# Patient Record
Sex: FEMALE | Race: WHITE | NOT HISPANIC OR LATINO | Employment: FULL TIME | URBAN - METROPOLITAN AREA
[De-identification: names, ages, dates, MRNs, and addresses within clinical notes are randomized per-mention and may not be internally consistent; named-entity substitution may affect disease eponyms.]

---

## 2023-08-28 PROBLEM — L57.9 SKIN CHANGES DUE TO CHRONIC EXPOSURE TO NONIONIZING RADIATION, UNSPECIFIED: Status: ACTIVE | Noted: 2020-11-02

## 2023-08-28 PROBLEM — D22.60 MELANOCYTIC NEVI OF UNSPECIFIED UPPER LIMB, INCLUDING SHOULDER: Status: ACTIVE | Noted: 2020-11-02

## 2023-08-28 PROBLEM — D22.5 MELANOCYTIC NEVI OF TRUNK: Status: ACTIVE | Noted: 2020-11-02

## 2023-08-28 PROBLEM — E66.9 CLASS 2 OBESITY WITH BODY MASS INDEX (BMI) OF 38.0 TO 38.9 IN ADULT: Status: ACTIVE | Noted: 2023-08-28

## 2023-08-28 PROBLEM — L71.9 ROSACEA, UNSPECIFIED: Status: ACTIVE | Noted: 2020-11-02

## 2023-08-28 PROBLEM — D18.01 HEMANGIOMA OF SKIN AND SUBCUTANEOUS TISSUE: Status: ACTIVE | Noted: 2020-11-02

## 2023-08-28 PROBLEM — D22.39 MELANOCYTIC NEVI OF OTHER PARTS OF FACE: Status: ACTIVE | Noted: 2020-11-02

## 2023-08-28 PROBLEM — E66.812 CLASS 2 OBESITY WITH BODY MASS INDEX (BMI) OF 38.0 TO 38.9 IN ADULT: Status: ACTIVE | Noted: 2023-08-28

## 2023-08-28 PROBLEM — L98.9 SKIN LESION: Status: ACTIVE | Noted: 2023-08-28

## 2023-08-28 PROBLEM — D22.4 MELANOCYTIC NEVI OF SCALP AND NECK: Status: ACTIVE | Noted: 2020-11-02

## 2023-08-28 PROBLEM — R06.83 SNORING: Status: ACTIVE | Noted: 2023-08-28

## 2023-08-28 PROBLEM — I10 ESSENTIAL HYPERTENSION, BENIGN: Status: ACTIVE | Noted: 2023-08-28

## 2023-08-28 PROBLEM — D48.5 NEOPLASM OF UNCERTAIN BEHAVIOR OF SKIN: Status: ACTIVE | Noted: 2020-11-02

## 2023-08-28 PROBLEM — E66.01 MORBID OBESITY (MULTI): Status: ACTIVE | Noted: 2023-08-28

## 2023-08-28 PROBLEM — L63.9 ALOPECIA AREATA, UNSPECIFIED: Status: ACTIVE | Noted: 2020-11-02

## 2023-08-28 RX ORDER — CLOBETASOL PROPIONATE 0.46 MG/ML
1 SOLUTION TOPICAL
COMMUNITY
Start: 2020-05-27

## 2023-08-28 RX ORDER — BENZONATATE 100 MG/1
1 CAPSULE ORAL 3 TIMES DAILY PRN
COMMUNITY
End: 2024-03-26 | Stop reason: WASHOUT

## 2023-08-28 RX ORDER — CHLORTHALIDONE 25 MG/1
1 TABLET ORAL DAILY
COMMUNITY
Start: 2022-06-16 | End: 2023-10-27

## 2023-09-25 LAB
ALANINE AMINOTRANSFERASE (SGPT) (U/L) IN SER/PLAS: 20 U/L (ref 7–45)
ALBUMIN (G/DL) IN SER/PLAS: 4.3 G/DL (ref 3.4–5)
ALKALINE PHOSPHATASE (U/L) IN SER/PLAS: 86 U/L (ref 33–110)
ANION GAP IN SER/PLAS: 16 MMOL/L (ref 10–20)
ASPARTATE AMINOTRANSFERASE (SGOT) (U/L) IN SER/PLAS: 15 U/L (ref 9–39)
BILIRUBIN TOTAL (MG/DL) IN SER/PLAS: 0.6 MG/DL (ref 0–1.2)
CALCIUM (MG/DL) IN SER/PLAS: 9.4 MG/DL (ref 8.6–10.6)
CARBON DIOXIDE, TOTAL (MMOL/L) IN SER/PLAS: 27 MMOL/L (ref 21–32)
CHLORIDE (MMOL/L) IN SER/PLAS: 103 MMOL/L (ref 98–107)
CHOLESTEROL (MG/DL) IN SER/PLAS: 230 MG/DL (ref 0–199)
CHOLESTEROL IN HDL (MG/DL) IN SER/PLAS: 57.7 MG/DL
CHOLESTEROL/HDL RATIO: 4
CREATININE (MG/DL) IN SER/PLAS: 0.74 MG/DL (ref 0.5–1.05)
ERYTHROCYTE DISTRIBUTION WIDTH (RATIO) BY AUTOMATED COUNT: 13.4 % (ref 11.5–14.5)
ERYTHROCYTE MEAN CORPUSCULAR HEMOGLOBIN CONCENTRATION (G/DL) BY AUTOMATED: 33.5 G/DL (ref 32–36)
ERYTHROCYTE MEAN CORPUSCULAR VOLUME (FL) BY AUTOMATED COUNT: 91 FL (ref 80–100)
ERYTHROCYTES (10*6/UL) IN BLOOD BY AUTOMATED COUNT: 4.53 X10E12/L (ref 4–5.2)
ESTIMATED AVERAGE GLUCOSE FOR HBA1C: 103 MG/DL
GFR FEMALE: >90 ML/MIN/1.73M2
GLUCOSE (MG/DL) IN SER/PLAS: 95 MG/DL (ref 74–99)
HEMATOCRIT (%) IN BLOOD BY AUTOMATED COUNT: 41.2 % (ref 36–46)
HEMOGLOBIN (G/DL) IN BLOOD: 13.8 G/DL (ref 12–16)
HEMOGLOBIN A1C/HEMOGLOBIN TOTAL IN BLOOD: 5.2 %
LDL: 151 MG/DL (ref 0–99)
LEUKOCYTES (10*3/UL) IN BLOOD BY AUTOMATED COUNT: 8.3 X10E9/L (ref 4.4–11.3)
NRBC (PER 100 WBCS) BY AUTOMATED COUNT: 0 /100 WBC (ref 0–0)
PLATELETS (10*3/UL) IN BLOOD AUTOMATED COUNT: 304 X10E9/L (ref 150–450)
POTASSIUM (MMOL/L) IN SER/PLAS: 3.7 MMOL/L (ref 3.5–5.3)
PROTEIN TOTAL: 6.8 G/DL (ref 6.4–8.2)
SODIUM (MMOL/L) IN SER/PLAS: 142 MMOL/L (ref 136–145)
THYROTROPIN (MIU/L) IN SER/PLAS BY DETECTION LIMIT <= 0.05 MIU/L: 1.15 MIU/L (ref 0.44–3.98)
TRIGLYCERIDE (MG/DL) IN SER/PLAS: 109 MG/DL (ref 0–149)
UREA NITROGEN (MG/DL) IN SER/PLAS: 19 MG/DL (ref 6–23)
VLDL: 22 MG/DL (ref 0–40)

## 2023-10-05 ENCOUNTER — APPOINTMENT (OUTPATIENT)
Dept: PRIMARY CARE | Facility: CLINIC | Age: 53
End: 2023-10-05
Payer: COMMERCIAL

## 2023-10-26 DIAGNOSIS — I10 ESSENTIAL HYPERTENSION, BENIGN: Primary | ICD-10-CM

## 2023-10-27 RX ORDER — CHLORTHALIDONE 25 MG/1
25 TABLET ORAL DAILY
Qty: 90 TABLET | Refills: 0 | Status: SHIPPED | OUTPATIENT
Start: 2023-10-27 | End: 2023-12-28 | Stop reason: SDUPTHER

## 2023-11-30 DIAGNOSIS — Z12.11 SPECIAL SCREENING FOR MALIGNANT NEOPLASMS, COLON: ICD-10-CM

## 2023-11-30 RX ORDER — POLYETHYLENE GLYCOL 3350, SODIUM SULFATE ANHYDROUS, SODIUM BICARBONATE, SODIUM CHLORIDE, POTASSIUM CHLORIDE 236; 22.74; 6.74; 5.86; 2.97 G/4L; G/4L; G/4L; G/4L; G/4L
POWDER, FOR SOLUTION ORAL
Qty: 4000 ML | Refills: 0 | Status: SHIPPED | OUTPATIENT
Start: 2023-11-30 | End: 2024-03-26 | Stop reason: WASHOUT

## 2023-12-11 ENCOUNTER — OFFICE VISIT (OUTPATIENT)
Dept: PRIMARY CARE | Facility: CLINIC | Age: 53
End: 2023-12-11
Payer: COMMERCIAL

## 2023-12-11 VITALS — RESPIRATION RATE: 14 BRPM | HEART RATE: 74 BPM

## 2023-12-11 DIAGNOSIS — S83.90XA SPRAIN OF KNEE, UNSPECIFIED LATERALITY, UNSPECIFIED LIGAMENT, INITIAL ENCOUNTER: Primary | ICD-10-CM

## 2023-12-11 PROCEDURE — 99213 OFFICE O/P EST LOW 20 MIN: CPT | Performed by: INTERNAL MEDICINE

## 2023-12-11 NOTE — PROGRESS NOTES
Cinthya is a pleasant 53-year-old female here for evaluation of right knee injury.  Last night she was walking her dog, got tangled in the leash, knee was buckled, had pain after.  No swelling.  Since yesterday the pain is improved, she feels little numbness in the back of her knee.  No swelling.  She is able to walk, she is able to bend her knee but it does hurt slightly with full flexion.  No ankle pain.  No weakness numbness tingling of the lower extremity.  No back pain.  Otherwise she is doing well.    No fevers no chills, no weight changes.    No URI symptoms    No cough no shortness of breath    No chest pain no palpitations    Appetite intact, no abdominal pain.    No new or unusual headaches.    Vitals and exam:Pulse 74, resp. rate 14.  Calm coherent well-appearing    Neck is supple    Breathing comfortably no cough    Regular rate and rhythm, no edema    Abdomen soft and nontender    Extremities warm well perfused with no clubbing or cyanosis    Right knee with no swelling, no effusion, no erythema no pallor, she does have full range of motion but some pain with full active flexion.  No medial lateral laxity.  Anterior drawer sign negative.  Distally her foot is well-perfused with intact sensation.  Calf nontender.    Assessment plan: Cinthya is a pleasant 53-year-old female with right knee injury, reassuring exam, no concern for fracture or ligamentous tear, consistent with sprain, icing twice a day for 10 to 15 minutes, mild range of motion activity as tolerated, she may resume normal activity as tolerated.  Call if symptoms not resolved by end of the week.

## 2023-12-20 ENCOUNTER — OFFICE VISIT (OUTPATIENT)
Dept: PRIMARY CARE | Facility: CLINIC | Age: 53
End: 2023-12-20
Payer: COMMERCIAL

## 2023-12-20 ENCOUNTER — APPOINTMENT (OUTPATIENT)
Dept: PRIMARY CARE | Facility: CLINIC | Age: 53
End: 2023-12-20
Payer: COMMERCIAL

## 2023-12-20 VITALS — RESPIRATION RATE: 16 BRPM | HEART RATE: 74 BPM

## 2023-12-20 DIAGNOSIS — S89.91XD INJURY OF RIGHT KNEE, SUBSEQUENT ENCOUNTER: Primary | ICD-10-CM

## 2023-12-20 PROCEDURE — 1036F TOBACCO NON-USER: CPT | Performed by: INTERNAL MEDICINE

## 2023-12-20 PROCEDURE — 99213 OFFICE O/P EST LOW 20 MIN: CPT | Performed by: INTERNAL MEDICINE

## 2023-12-20 NOTE — PROGRESS NOTES
Follow UP Visit     Cinthya is a pleasant 53-year-old female here for follow-up of right knee injury.  She sprained her right knee while walking her dog, she is feeling much better, the numbness has resolved, she still has some mild pain in the lateral part of the knee but this only comes with bending her knee.  She has no difficulty walking.  No swelling.  No hip pain no ankle pain.  No falls otherwise.  No weakness numbness tingling of the lower extremity.  Otherwise he has no complaints.      No fevers no chills, no weight changes.    No URI symptoms    No cough no shortness of breath    No chest pain no palpitations    Appetite intact, no abdominal pain.    No new or unusual headaches.    Vitals and exam:  Pulse 74, resp. rate 16.    Calm coherent well-appearing    Neck is supple    Breathing comfortably no cough    Regular rate and rhythm, no edema    Abdomen soft and nontender    Extremities warm well perfused with no clubbing or cyanosis    Right knee with no swelling, no effusion, no erythema no pallor, full range of motion with no pain.  No medial lateral laxity.  Anterior drawer sign negative.  Distally her foot is well-perfused with intact sensation.  Calf nontender.    Assessment plan: Cinthya is a pleasant 53-year-old female with right knee injury, reassuring exam, given that she still has mild persistent pain will refer to physical therapy.  No indication for imaging.    She has scheduled her mammogram and colonoscopy.

## 2023-12-22 ENCOUNTER — ANCILLARY PROCEDURE (OUTPATIENT)
Dept: RADIOLOGY | Facility: CLINIC | Age: 53
End: 2023-12-22
Payer: COMMERCIAL

## 2023-12-22 DIAGNOSIS — Z12.39 ENCOUNTER FOR OTHER SCREENING FOR MALIGNANT NEOPLASM OF BREAST: ICD-10-CM

## 2023-12-22 PROCEDURE — 77067 SCR MAMMO BI INCL CAD: CPT | Mod: BILATERAL PROCEDURE | Performed by: RADIOLOGY

## 2023-12-22 PROCEDURE — 77067 SCR MAMMO BI INCL CAD: CPT

## 2023-12-22 PROCEDURE — 77063 BREAST TOMOSYNTHESIS BI: CPT | Mod: BILATERAL PROCEDURE | Performed by: RADIOLOGY

## 2023-12-27 DIAGNOSIS — Z12.11 COLON CANCER SCREENING: Primary | ICD-10-CM

## 2023-12-28 DIAGNOSIS — I10 ESSENTIAL HYPERTENSION, BENIGN: ICD-10-CM

## 2023-12-28 RX ORDER — POLYETHYLENE GLYCOL 3350, SODIUM SULFATE ANHYDROUS, SODIUM BICARBONATE, SODIUM CHLORIDE, POTASSIUM CHLORIDE 236; 22.74; 6.74; 5.86; 2.97 G/4L; G/4L; G/4L; G/4L; G/4L
4000 POWDER, FOR SOLUTION ORAL ONCE
Qty: 4000 ML | Refills: 0 | Status: SHIPPED | OUTPATIENT
Start: 2023-12-28 | End: 2023-12-28

## 2023-12-29 RX ORDER — CHLORTHALIDONE 25 MG/1
25 TABLET ORAL DAILY
Qty: 90 TABLET | Refills: 0 | Status: SHIPPED | OUTPATIENT
Start: 2023-12-29 | End: 2024-03-26 | Stop reason: SDUPTHER

## 2024-01-03 DIAGNOSIS — Z12.11 SPECIAL SCREENING FOR MALIGNANT NEOPLASMS, COLON: ICD-10-CM

## 2024-01-03 RX ORDER — POLYETHYLENE GLYCOL 3350, SODIUM SULFATE ANHYDROUS, SODIUM BICARBONATE, SODIUM CHLORIDE, POTASSIUM CHLORIDE 236; 22.74; 6.74; 5.86; 2.97 G/4L; G/4L; G/4L; G/4L; G/4L
POWDER, FOR SOLUTION ORAL
Qty: 4000 ML | Refills: 0 | Status: SHIPPED | OUTPATIENT
Start: 2024-01-03

## 2024-01-19 ENCOUNTER — TELEPHONE (OUTPATIENT)
Dept: PRIMARY CARE | Facility: CLINIC | Age: 54
End: 2024-01-19
Payer: COMMERCIAL

## 2024-01-22 DIAGNOSIS — Z12.11 COLON CANCER SCREENING: Primary | ICD-10-CM

## 2024-01-29 ENCOUNTER — APPOINTMENT (OUTPATIENT)
Dept: GASTROENTEROLOGY | Facility: EXTERNAL LOCATION | Age: 54
End: 2024-01-29
Payer: COMMERCIAL

## 2024-02-05 ENCOUNTER — TELEPHONE (OUTPATIENT)
Dept: PRIMARY CARE | Facility: CLINIC | Age: 54
End: 2024-02-05
Payer: COMMERCIAL

## 2024-02-19 ENCOUNTER — APPOINTMENT (OUTPATIENT)
Dept: PRIMARY CARE | Facility: CLINIC | Age: 54
End: 2024-02-19
Payer: COMMERCIAL

## 2024-02-29 ENCOUNTER — OFFICE VISIT (OUTPATIENT)
Dept: UROLOGY | Facility: CLINIC | Age: 54
End: 2024-02-29
Payer: COMMERCIAL

## 2024-02-29 VITALS
TEMPERATURE: 97.4 F | DIASTOLIC BLOOD PRESSURE: 74 MMHG | SYSTOLIC BLOOD PRESSURE: 124 MMHG | HEART RATE: 61 BPM | BODY MASS INDEX: 40.97 KG/M2 | HEIGHT: 64 IN | WEIGHT: 240 LBS

## 2024-02-29 DIAGNOSIS — R19.00 PELVIC FULLNESS: ICD-10-CM

## 2024-02-29 DIAGNOSIS — N95.1 SYMPTOMATIC MENOPAUSAL OR FEMALE CLIMACTERIC STATES: ICD-10-CM

## 2024-02-29 DIAGNOSIS — Z01.419 PAP SMEAR, AS PART OF ROUTINE GYNECOLOGICAL EXAMINATION: ICD-10-CM

## 2024-02-29 PROCEDURE — 99205 OFFICE O/P NEW HI 60 MIN: CPT | Performed by: OBSTETRICS & GYNECOLOGY

## 2024-02-29 PROCEDURE — 87624 HPV HI-RISK TYP POOLED RSLT: CPT

## 2024-02-29 PROCEDURE — 1036F TOBACCO NON-USER: CPT | Performed by: OBSTETRICS & GYNECOLOGY

## 2024-02-29 PROCEDURE — 3078F DIAST BP <80 MM HG: CPT | Performed by: OBSTETRICS & GYNECOLOGY

## 2024-02-29 PROCEDURE — 3074F SYST BP LT 130 MM HG: CPT | Performed by: OBSTETRICS & GYNECOLOGY

## 2024-02-29 PROCEDURE — 88175 CYTOPATH C/V AUTO FLUID REDO: CPT

## 2024-02-29 RX ORDER — PROGESTERONE 100 MG/1
100 CAPSULE ORAL DAILY
Qty: 60 CAPSULE | Refills: 3 | Status: SHIPPED | OUTPATIENT
Start: 2024-02-29 | End: 2024-10-26

## 2024-02-29 RX ORDER — ESTRADIOL 0.05 MG/D
1 PATCH, EXTENDED RELEASE TRANSDERMAL 2 TIMES WEEKLY
Qty: 24 PATCH | Refills: 1 | Status: SHIPPED | OUTPATIENT
Start: 2024-02-29 | End: 2024-03-07 | Stop reason: SDUPTHER

## 2024-02-29 ASSESSMENT — PAIN SCALES - GENERAL: PAINLEVEL: 0-NO PAIN

## 2024-02-29 NOTE — PROGRESS NOTES
INITIAL EVALUATION       PROBLEM LIST:  Menopause       HISTORY OF PRESENT ILLNESS:   Cinthya Simmons is a 53 y.o. female is a new patient who presents in office for a routine gynecological examination and to discuss menopause.     Her LMP was about a year ago with menopausal symptoms starting more recently. She reports memory and mood issues and inquires about use of hormones to help. She denies sleep difficulties, hot flashes, and low libido.     The patient denies history of cancer or blood clots. She has a history of mild depression but has not had formal treatment for this. She has a family history of breast cancer with her mother who passed away after being dx'd in her late 80's.      Her last pap smear was in 2018. She is up-to-date with her mammogram, done on December 2023. She is inquiring about a pelvic ultrasound for abdominal fullness.      PAST MEDICAL HISTORY:  No past medical history on file.    PAST SURGICAL HISTORY:  Past Surgical History:   Procedure Laterality Date    APPENDECTOMY  06/06/2017    Appendectomy        ALLERGIES:   No Known Allergies      SOCIAL HISTORY:  Patient  reports that she has never smoked. She has never been exposed to tobacco smoke. She has never used smokeless tobacco. She reports that she does not drink alcohol and does not use drugs.   Social History     Socioeconomic History    Marital status:      Spouse name: Not on file    Number of children: Not on file    Years of education: Not on file    Highest education level: Not on file   Occupational History    Not on file   Tobacco Use    Smoking status: Never     Passive exposure: Never    Smokeless tobacco: Never   Substance and Sexual Activity    Alcohol use: Never    Drug use: Never    Sexual activity: Not on file   Other Topics Concern    Not on file   Social History Narrative    Not on file     Social Determinants of Health     Financial Resource Strain: Not on file   Food Insecurity: Not on file  "  Transportation Needs: Not on file   Physical Activity: Not on file   Stress: Not on file   Social Connections: Not on file   Intimate Partner Violence: Not on file   Housing Stability: Not on file       FAMILY HISTORY:  Family History   Problem Relation Name Age of Onset    Arthritis Mother      Breast cancer Mother      Coronary artery disease Father      COPD Father         REVIEW OF SYSTEMS:  Constitutional: Negative for fever and chills. Denies anorexia, weight loss.  Eyes: Negative for visual disturbance.   Respiratory: Negative for shortness of breath.    Cardiovascular: Negative for chest pain.   Gastrointestinal: Negative for nausea and vomiting.   Genitourinary: See interval history above.  Skin: Negative for rash.   Neurological: Negative for dizziness and numbness.   Psychiatric/Behavioral: Negative for confusion and decreased concentration.     PHYSICAL EXAM:  Blood pressure 124/74, pulse 61, temperature 36.3 °C (97.4 °F), height 1.626 m (5' 4\"), weight 109 kg (240 lb).  Constitutional: Patient appears well-developed and well-nourished. No distress.    Head: Normocephalic and atraumatic.    Neck: Normal range of motion.    Cardiovascular: Normal rate.    Pulmonary/Chest: Effort normal. No respiratory distress.     :   External female genitalia is normal    There are no lesions on vulva, labia major or the labia minora     The clitoral prepuce is normal     The urethra is also normal     Speculum exam done gently and cervix visualized, no friable tissue, cysts/lesions noted     Pap smear completed      Bimanual exam done and uterus is small and adnexa are nontender and without masses appreciated     Musculoskeletal: Normal range of motion.    Neurological: Alert and oriented to person, place, and time.  Psychiatric: Normal mood and affect. Behavior is normal. Thought content normal.             Assessment:      1. Pap smear, as part of routine gynecological examination  THINPREP PAP TEST      2. " Symptomatic menopausal or female climacteric states        3. Pelvic fullness  US pelvis transvaginal        Procedure Note: External female genitalia is consistent with genitourinary changes of menopause     Tissues are thin and atrophic     There are no lesions on the labia minora     There is atrophic labia minor with loss of architecture     The clitoral prepuce is fused     The urethra is slightly yellowed and atrophic appearing     Speculum exam done gently and cervix visualized, no friable tissue, cysts/lesions noted     Pap smear completed      Bimanual exam done and uterus is small and adnexa are nontender and without masses appreciated          Plan:   Current research considers the initiation of menopausal hormone therapy (MHT) to be a safe option for healthy, symptomatic women who are within 10 years of menopause or younger than age 60 years and who do not have contraindications to MHT (such as a history of breast cancer, coronary heart disease, a previous venous thromboembolic event or stroke, or active liver disease.     Benefits include improvement in mood, sleep, and hot flashes. We will start at the lowest dose of a weekly estrogen patch and consider stopping after resolution of symptoms.      We will re-evaluate every 6-12 months to see if the dose is the right amount of estrogen and if it is still needed. Risk of breast cancer is individualized, but we will try to avoid continuing estrogen beyond 5 years for this reason unless menopausal symptoms continue to disrupt her quality of life. At that time, we can make a shared and informed decision about risks/benefits.     2. Menopause  Will start on estrodiol 0.05 mg patch and progesterone 100 mg nightly. Will assess how she is feeling before starting Wellbutrin.     SEAN/theresa Morrison 2-3 months     Scribe Attestation  By signing my name below, INegro, Scribe   attest that this documentation has been prepared under the direction and in the  presence of Kasey Garcia MD MPH.

## 2024-03-01 ENCOUNTER — APPOINTMENT (OUTPATIENT)
Dept: UROLOGY | Facility: CLINIC | Age: 54
End: 2024-03-01
Payer: COMMERCIAL

## 2024-03-01 ENCOUNTER — APPOINTMENT (OUTPATIENT)
Dept: PRIMARY CARE | Facility: CLINIC | Age: 54
End: 2024-03-01
Payer: COMMERCIAL

## 2024-03-07 DIAGNOSIS — N95.1 SYMPTOMATIC MENOPAUSAL OR FEMALE CLIMACTERIC STATES: ICD-10-CM

## 2024-03-07 RX ORDER — ESTRADIOL 0.05 MG/D
1 FILM, EXTENDED RELEASE TRANSDERMAL 2 TIMES WEEKLY
Qty: 24 PATCH | Refills: 1 | Status: SHIPPED | OUTPATIENT
Start: 2024-03-07 | End: 2024-08-22

## 2024-03-14 LAB
CYTOLOGY CMNT CVX/VAG CYTO-IMP: NORMAL
HPV HR 12 DNA GENITAL QL NAA+PROBE: NEGATIVE
HPV HR GENOTYPES PNL CVX NAA+PROBE: NEGATIVE
HPV16 DNA SPEC QL NAA+PROBE: NEGATIVE
HPV18 DNA SPEC QL NAA+PROBE: NEGATIVE
LAB AP HPV GENOTYPE QUESTION: YES
LAB AP HPV HR: NORMAL
LABORATORY COMMENT REPORT: NORMAL
PATH REPORT.TOTAL CANCER: NORMAL

## 2024-03-24 LAB — NONINV COLON CA DNA+OCC BLD SCRN STL QL: NEGATIVE

## 2024-03-26 ENCOUNTER — OFFICE VISIT (OUTPATIENT)
Dept: PRIMARY CARE | Facility: CLINIC | Age: 54
End: 2024-03-26
Payer: COMMERCIAL

## 2024-03-26 VITALS
SYSTOLIC BLOOD PRESSURE: 106 MMHG | WEIGHT: 241 LBS | DIASTOLIC BLOOD PRESSURE: 70 MMHG | HEIGHT: 64 IN | BODY MASS INDEX: 41.15 KG/M2 | HEART RATE: 57 BPM | OXYGEN SATURATION: 96 %

## 2024-03-26 DIAGNOSIS — I10 ESSENTIAL HYPERTENSION, BENIGN: ICD-10-CM

## 2024-03-26 DIAGNOSIS — F17.210 NICOTINE DEPENDENCE, CIGARETTES, UNCOMPLICATED: Primary | ICD-10-CM

## 2024-03-26 DIAGNOSIS — E66.01 MORBID OBESITY (MULTI): ICD-10-CM

## 2024-03-26 PROBLEM — D48.5 NEOPLASM OF UNCERTAIN BEHAVIOR OF SKIN: Status: RESOLVED | Noted: 2020-11-02 | Resolved: 2024-03-26

## 2024-03-26 PROBLEM — L57.9 SKIN CHANGES DUE TO CHRONIC EXPOSURE TO NONIONIZING RADIATION, UNSPECIFIED: Status: RESOLVED | Noted: 2020-11-02 | Resolved: 2024-03-26

## 2024-03-26 PROBLEM — R51.9 SINUS HEADACHE: Status: ACTIVE | Noted: 2024-03-26

## 2024-03-26 PROBLEM — E66.9 CLASS 2 OBESITY WITH BODY MASS INDEX (BMI) OF 38.0 TO 38.9 IN ADULT: Status: RESOLVED | Noted: 2023-08-28 | Resolved: 2024-03-26

## 2024-03-26 PROBLEM — E66.812 CLASS 2 OBESITY WITH BODY MASS INDEX (BMI) OF 38.0 TO 38.9 IN ADULT: Status: RESOLVED | Noted: 2023-08-28 | Resolved: 2024-03-26

## 2024-03-26 PROBLEM — R51.9 SINUS HEADACHE: Status: RESOLVED | Noted: 2024-03-26 | Resolved: 2024-03-26

## 2024-03-26 PROCEDURE — 1036F TOBACCO NON-USER: CPT | Performed by: INTERNAL MEDICINE

## 2024-03-26 PROCEDURE — 3074F SYST BP LT 130 MM HG: CPT | Performed by: INTERNAL MEDICINE

## 2024-03-26 PROCEDURE — 99213 OFFICE O/P EST LOW 20 MIN: CPT | Performed by: INTERNAL MEDICINE

## 2024-03-26 PROCEDURE — 3078F DIAST BP <80 MM HG: CPT | Performed by: INTERNAL MEDICINE

## 2024-03-26 RX ORDER — CHLORTHALIDONE 25 MG/1
25 TABLET ORAL DAILY
Qty: 90 TABLET | Refills: 1 | Status: SHIPPED | OUTPATIENT
Start: 2024-03-26

## 2024-03-26 NOTE — ASSESSMENT & PLAN NOTE
Unable to lose weight despite conservative care.  Consider GLP-1.  Patient to call insurance to see if it is covered.  Continue with weight reduction efforts including regular exercise and low calorie diet.

## 2024-03-26 NOTE — ASSESSMENT & PLAN NOTE
Controlled on current medications.  No medication adjustments.  Follow-up 6 months for wellness exam

## 2024-03-26 NOTE — PROGRESS NOTES
"Subjective   Patient ID: Cinthya Simmons is a 53 y.o. female who presents for New Patient Visit (Blood Pressure).    HPI    Patient is a 53-year-old female with past medical history of hypertension obesity and history of smoking presents for establishment of care.  Patient's PCP has recently left [facility].    Patient is interested in getting a low-dose CT scan for cancer screening of the lungs.  She has a 20 pack year history.  No recent unintentional weight loss or coughing up blood.  No shortness of breath.  She quit 12 years ago.    Hypertension currently well-controlled on chlorthalidone.  Needs refills of the medications.  No headache changes in vision orthopnea.  Compliant with medications    Obesity.  Patient has been trying to lose weight unsuccessfully.  She been trying to exercise and decrease her caloric intake but no significant reduction in weight.  She is interested in a GLP-1.    Review of Systems  Constitutional: No fever or chills  Cardiovascular: no chest pain, no palpitations and no syncope.   Respiratory: no cough, no shortness of breath during exertion and no shortness of breath at rest.   Gastrointestinal: no abdominal pain, no nausea and no vomiting.  Neuro: No Headache, no dizziness    Objective   /70   Pulse 57   Ht 1.626 m (5' 4\")   Wt 109 kg (241 lb)   SpO2 96%   BMI 41.37 kg/m²     Physical Exam  Constitutional: Alert and in no acute distress. Well developed, well nourished  Head and Face: Head and face: Normal.    Cardiovascular: Heart rate and rhythm were normal, normal S1 and S2. No peripheral edema.   Pulmonary: No respiratory distress. Clear bilateral breath sounds.  Musculoskeletal: Gait and station: Normal. Muscle strength/tone: Normal.   Skin: Normal skin color and pigmentation, normal skin turgor, and no rash.    Psychiatric: Judgment and insight: Intact. Mood and affect: Normal.    Procedures    Lab Results   Component Value Date    WBC 8.3 09/25/2023    " HGB 13.8 09/25/2023    HCT 41.2 09/25/2023     09/25/2023    CHOL 230 (H) 09/25/2023    TRIG 109 09/25/2023    HDL 57.7 09/25/2023    ALT 20 09/25/2023    AST 15 09/25/2023     09/25/2023    K 3.7 09/25/2023     09/25/2023    CREATININE 0.74 09/25/2023    BUN 19 09/25/2023    CO2 27 09/25/2023    TSH 1.15 09/25/2023    HGBA1C 5.2 09/25/2023       BI mammo bilateral screening tomosynthesis  Narrative: Interpreted By:  Brynn Chandler,   STUDY:  BI MAMMO BILATERAL SCREENING TOMOSYNTHESIS;  12/22/2023 9:19 am      ACCESSION NUMBER(S):  [id number]      ORDERING CLINICIAN:  RAOUL SULLIVAN      INDICATION:  Screening.          COMPARISON:  06/21/2022 12/30/2019, 12/26/2018, 06/28/2017, 09/14/2011      FINDINGS:  2D and tomosynthesis images were reviewed at 1 mm slice thickness.      Density:  There are areas of scattered fibroglandular tissue.      No suspicious masses or calcifications are identified.      Impression: No mammographic evidence of malignancy.      BI-RADS CATEGORY:      BI-RADS Category:  1 Negative.  Recommendation:  Routine Screening Mammogram in 1 Year.  Recommended Date:  1 Year.  Laterality:  Bilateral.      For any future breast imaging appointments, please call [phone]  ([phone]).          MACRO:  None      Signed by: Brynn Chandler 12/28/2023 7:59 AM  Dictation workstation:   [id number]            Assessment/Plan   Problem List Items Addressed This Visit             ICD-10-CM    Essential hypertension, benign I10     Controlled on current medications.  No medication adjustments.  Follow-up 6 months for wellness exam         Relevant Medications    chlorthalidone (Hygroton) 25 mg tablet    Other Relevant Orders    Follow Up In [facility] - PCP - Established    Morbid obesity (CMS/HCC) E66.01     Unable to lose weight despite conservative care.  Consider GLP-1.  Patient to call insurance to see if it is covered.  Continue with weight reduction efforts including regular  exercise and low calorie diet.          Other Visit Diagnoses         Codes    Nicotine dependence, cigarettes, uncomplicated    -  Primary F17.210    Relevant Orders    CT lung screening low dose    Follow Up In [facility] - PCP - Established

## 2024-03-30 ENCOUNTER — HOSPITAL ENCOUNTER (OUTPATIENT)
Dept: RADIOLOGY | Facility: HOSPITAL | Age: 54
Discharge: HOME | End: 2024-03-30
Payer: COMMERCIAL

## 2024-03-30 DIAGNOSIS — R19.00 PELVIC FULLNESS: ICD-10-CM

## 2024-03-30 PROCEDURE — 76856 US EXAM PELVIC COMPLETE: CPT

## 2024-04-08 ENCOUNTER — APPOINTMENT (OUTPATIENT)
Dept: PRIMARY CARE | Facility: CLINIC | Age: 54
End: 2024-04-08
Payer: COMMERCIAL

## 2024-04-22 ENCOUNTER — APPOINTMENT (OUTPATIENT)
Dept: PRIMARY CARE | Facility: CLINIC | Age: 54
End: 2024-04-22
Payer: COMMERCIAL

## 2024-05-29 ENCOUNTER — HOSPITAL ENCOUNTER (OUTPATIENT)
Dept: RADIOLOGY | Facility: HOSPITAL | Age: 54
Discharge: HOME | End: 2024-05-29
Payer: COMMERCIAL

## 2024-05-29 DIAGNOSIS — F17.210 NICOTINE DEPENDENCE, CIGARETTES, UNCOMPLICATED: ICD-10-CM

## 2024-05-29 PROCEDURE — 71271 CT THORAX LUNG CANCER SCR C-: CPT

## 2024-06-03 ENCOUNTER — APPOINTMENT (OUTPATIENT)
Dept: UROLOGY | Facility: CLINIC | Age: 54
End: 2024-06-03
Payer: COMMERCIAL

## 2024-06-03 ENCOUNTER — TELEMEDICINE (OUTPATIENT)
Dept: UROLOGY | Facility: CLINIC | Age: 54
End: 2024-06-03
Payer: COMMERCIAL

## 2024-06-03 DIAGNOSIS — N95.1 SYMPTOMATIC MENOPAUSAL OR FEMALE CLIMACTERIC STATES: Primary | ICD-10-CM

## 2024-06-03 PROCEDURE — 99442 PR PHYS/QHP TELEPHONE EVALUATION 11-20 MIN: CPT | Performed by: NURSE PRACTITIONER

## 2024-06-03 NOTE — Clinical Note
Will arrange appt. W Dr. Garcia when back in office, until then will reach out to Dr. Garcia, bleeding once a spot of blood, has since resolved, no further, happy w MHT. TVUS done in February adenomyosis, 0.4 cm endometrial thickness;

## 2024-06-03 NOTE — PROGRESS NOTES
"06/03/24   [id number]    Follow up menopause symptoms     Subjective      HPI Cinthya Simmons is a 53 y.o. female who presents for follow up menopause symptoms. Saw Dr. Garcia on 2/29/24. Prescribed estadiol 0.05 mg/24 hr patch and prometrium 100 mg; Per patient \"I am a fan\" of the MHT \"I love it\" the first month could tell a difference; vaginal dryness and more dyspareunia; feels brain functioning better; no side effects, saw a little spot of blood two weeks ago, looked like end of menses but nothing before or since; no bleeding now;      No refills needed, happy w dosing;     TVUS 3/30/24: IMPRESSION: ordered due to pelvic fullness on exam w Dr. Garcia in February 2024; showed Heterogeneous anterior uterine myometrium suggesting adenomyosis. MRI may further assess as warranted.  No concerning abnormality in the right ovary.   Left ovary not seen.      PMH, PSH, FH, SH reviewed.       Objective     There were no vitals taken for this visit.   Physical Exam  Deferred for phone visit  Assessment/Plan   Problem List Items Addressed This Visit    None  Visit Diagnoses       Symptomatic menopausal or female climacteric states    -  Primary          No orders of the defined types were placed in this encounter.     Will make follow up w Dr. Garcia when she's back in office, until then will reach out to her as she had spot of blood couple weeks ago, no further    Nurse line [phone]       JHON Tirado-CNP  Lab Results   Component Value Date    GLUCOSE 95 09/25/2023    CALCIUM 9.4 09/25/2023     09/25/2023    K 3.7 09/25/2023    CO2 27 09/25/2023     09/25/2023    BUN 19 09/25/2023    CREATININE 0.74 09/25/2023       "

## 2024-07-15 ENCOUNTER — APPOINTMENT (OUTPATIENT)
Dept: PRIMARY CARE | Facility: CLINIC | Age: 54
End: 2024-07-15
Payer: COMMERCIAL

## 2024-08-01 ENCOUNTER — APPOINTMENT (OUTPATIENT)
Dept: UROLOGY | Facility: CLINIC | Age: 54
End: 2024-08-01
Payer: COMMERCIAL

## 2024-08-14 ENCOUNTER — TELEMEDICINE (OUTPATIENT)
Dept: UROLOGY | Facility: HOSPITAL | Age: 54
End: 2024-08-14
Payer: COMMERCIAL

## 2024-08-14 DIAGNOSIS — R19.00 PELVIC FULLNESS: ICD-10-CM

## 2024-08-14 DIAGNOSIS — N95.1 SYMPTOMATIC MENOPAUSAL OR FEMALE CLIMACTERIC STATES: ICD-10-CM

## 2024-08-14 PROCEDURE — 99213 OFFICE O/P EST LOW 20 MIN: CPT | Performed by: OBSTETRICS & GYNECOLOGY

## 2024-08-14 NOTE — PROGRESS NOTES
FOLLOW-UP VISIT     PROBLEM LIST:  1. Adenomyosis  2. Menopause       HISTORY OF PRESENT ILLNESS:   Cinthya Simmons is a 54 y.o. female who was last seen on 2/29/24 for a routine gynecological examination and to discuss menopause. She presents virtually today for adenomyosis.     The patient states she feels better while taking the estradiol 0.05 mg patch and progesterone 100 mg tablet. She was scheduled for an endometrial biopsy on 8/15/24 and stopped taking estradiol in preparation for this. She canceled the procedure due to being nervous for the biopsy. She is interested in possibly a lower dose of the estradiol patches.     PAST MEDICAL HISTORY:  No past medical history on file.    PAST SURGICAL HISTORY:  Past Surgical History:   Procedure Laterality Date    APPENDECTOMY  06/06/2017    Appendectomy        ALLERGIES:   No Known Allergies     MEDICATIONS:   Medication Documentation Review Audit       Reviewed by William Medel DO (Physician) on 03/26/24 at 1617      Medication Order Taking? Sig Documenting Provider Last Dose Status   benzonatate (Tessalon) 100 mg capsule [id number] No 1 capsule (100 mg) 3 times a day as needed. Swallow whole *DO NOT BREAK CHEW, DISSOLVE, CUT, OR CRUSH* Historical Provider, MD Taking Active   chlorthalidone (Hygroton) 25 mg tablet [id number] No Take 1 tablet (25 mg) by mouth once daily. Oscar Perdomo MD Taking Active   clobetasol (Temovate) 0.05 % external solution [id number] No 1 Application. Historical Provider, MD Taking Active   estradiol (Vivelle-Dot) 0.05 mg/24 hr patch [id number]  Place 1 patch on the skin 2 times a week. Kasey Garcia MD MPH  Active   polyethylene glycol-electrolytes (Golytely) 420 gram solution [id number] No Begin drinking first half of prep 6pm the night before procedure. Start second half 5 hours before procedure time. Samanta Vitale MD Taking Active   polyethylene glycol-electrolytes (Golytely) 420 gram solution [id number] No Begin drinking first half of  prep 6pm the night before procedure. Start second half 5 hours before procedure time. Samanta Vitale MD Taking Active   progesterone (Prometrium) 100 mg capsule [id number]  Take 1 capsule (100 mg) by mouth once daily. Kasey Garcia MD MPH  Active                   SOCIAL HISTORY:  Patient  reports that she has never smoked. She has never been exposed to tobacco smoke. She has never used smokeless tobacco. She reports that she does not drink alcohol and does not use drugs.   Social History     Socioeconomic History    Marital status:      Spouse name: Not on file    Number of children: Not on file    Years of education: Not on file    Highest education level: Not on file   Occupational History    Not on file   Tobacco Use    Smoking status: Never     Passive exposure: Never    Smokeless tobacco: Never   Substance and Sexual Activity    Alcohol use: Never    Drug use: Never    Sexual activity: Not on file   Other Topics Concern    Not on file   Social History Narrative    Not on file     Social Determinants of Health     Financial Resource Strain: Not on file   Food Insecurity: Not on file   Transportation Needs: Not on file   Physical Activity: Not on file   Stress: Not on file   Social Connections: Not on file   Intimate Partner Violence: Not on file   Housing Stability: Not on file       FAMILY HISTORY:  Family History   Problem Relation Name Age of Onset    Arthritis Mother      Breast cancer Mother      Coronary artery disease Father      COPD Father         REVIEW OF SYSTEMS:  Constitutional: Negative for fever and chills. Denies anorexia, weight loss.  Eyes: Negative for visual disturbance.   Respiratory: Negative for shortness of breath.    Cardiovascular: Negative for chest pain.   Gastrointestinal: Negative for nausea and vomiting.   Genitourinary: See interval history above.  Skin: Negative for rash.   Neurological: Negative for dizziness and numbness.   Psychiatric/Behavioral: Negative for confusion  and decreased concentration.     PHYSICAL EXAM:  There were no vitals taken for this visit.  Virtual appointment, patient appears well.     RADIOLOGY REVIEW:  UTERUS:  The uterus measures 7.8 x 3.6 x 4.1 cm. Heterogeneous anterior uterine myometrium. No uterine masses. Nabothian cysts in the cervix. Small amount of endocervical fluid.      ENDOMETRIUM:  The endometrium measures 0.4 cm. No focal abnormality.      RIGHT OVARY:  1.8 x 1.1 x 1.1 cm. Small 2 mm echogenic focus suggestive of a calcification. Arterial and venous flow present.      LEFT OVARY:  Obscured by overlying bowel gas.      OTHER:  No significant pelvic free fluid.      IMPRESSION:  Heterogeneous anterior uterine myometrium suggesting adenomyosis. MRI may further assess as warranted.      No concerning abnormality in the right ovary.      Left ovary not seen.     Assessment:      1. Symptomatic menopausal or female climacteric states            Cinthya Simmons is a 54 y.o. female with adenomyosis and menopause.     We discussed her ultrasound results which are not concerning at this time and while I typically would do an EMB, the endometrial lining was not concerning. We will order her a repeat pelvic ultrasound to be completed in 3-6 months. We will also restart the patient on the estradiol patch due to this helping her menopausal symptoms and monitor for any bleeding.      Plan:   Patient will restart estradiol 0.05 mg patch.   Order pelvic ultrasound to be completed in 3-6 months.  Follow-up after pelvic ultrasound results.       Kasey Garcia MD MPH       Scribe Attestation  By signing my name below, IPascale, Scribe   attest that this documentation has been prepared under the direction and in the presence of Kasey Garcia MD MPH.

## 2024-08-15 ENCOUNTER — APPOINTMENT (OUTPATIENT)
Dept: UROLOGY | Facility: CLINIC | Age: 54
End: 2024-08-15
Payer: COMMERCIAL

## 2024-08-26 DIAGNOSIS — N95.1 SYMPTOMATIC MENOPAUSAL OR FEMALE CLIMACTERIC STATES: ICD-10-CM

## 2024-08-26 RX ORDER — ESTRADIOL 0.05 MG/D
1 FILM, EXTENDED RELEASE TRANSDERMAL 2 TIMES WEEKLY
Qty: 24 PATCH | Refills: 1 | Status: SHIPPED | OUTPATIENT
Start: 2024-08-26 | End: 2025-02-10

## 2024-09-03 ENCOUNTER — APPOINTMENT (OUTPATIENT)
Dept: PRIMARY CARE | Facility: CLINIC | Age: 54
End: 2024-09-03
Payer: COMMERCIAL

## 2024-09-10 DIAGNOSIS — N95.1 SYMPTOMATIC MENOPAUSAL OR FEMALE CLIMACTERIC STATES: ICD-10-CM

## 2024-09-11 RX ORDER — ESTRADIOL 0.07 MG/D
1 FILM, EXTENDED RELEASE TRANSDERMAL 2 TIMES WEEKLY
Qty: 24 PATCH | Refills: 1 | Status: SHIPPED | OUTPATIENT
Start: 2024-09-12 | End: 2025-02-27

## 2024-09-24 ENCOUNTER — APPOINTMENT (OUTPATIENT)
Dept: PRIMARY CARE | Facility: CLINIC | Age: 54
End: 2024-09-24
Payer: COMMERCIAL

## 2024-09-24 VITALS
HEART RATE: 62 BPM | WEIGHT: 250 LBS | DIASTOLIC BLOOD PRESSURE: 83 MMHG | OXYGEN SATURATION: 94 % | BODY MASS INDEX: 42.91 KG/M2 | SYSTOLIC BLOOD PRESSURE: 134 MMHG

## 2024-09-24 DIAGNOSIS — E66.01 CLASS 3 SEVERE OBESITY WITH SERIOUS COMORBIDITY AND BODY MASS INDEX (BMI) OF 40.0 TO 44.9 IN ADULT, UNSPECIFIED OBESITY TYPE: ICD-10-CM

## 2024-09-24 DIAGNOSIS — I10 ESSENTIAL HYPERTENSION, BENIGN: Primary | ICD-10-CM

## 2024-09-24 DIAGNOSIS — Z12.31 SCREENING MAMMOGRAM FOR BREAST CANCER: ICD-10-CM

## 2024-09-24 DIAGNOSIS — Z00.00 HEALTH CARE MAINTENANCE: ICD-10-CM

## 2024-09-24 PROCEDURE — 3079F DIAST BP 80-89 MM HG: CPT | Performed by: INTERNAL MEDICINE

## 2024-09-24 PROCEDURE — 99213 OFFICE O/P EST LOW 20 MIN: CPT | Performed by: INTERNAL MEDICINE

## 2024-09-24 PROCEDURE — 3075F SYST BP GE 130 - 139MM HG: CPT | Performed by: INTERNAL MEDICINE

## 2024-09-24 PROCEDURE — 1036F TOBACCO NON-USER: CPT | Performed by: INTERNAL MEDICINE

## 2024-09-24 RX ORDER — BUPROPION HYDROCHLORIDE 150 MG/1
150 TABLET ORAL EVERY MORNING
Qty: 30 TABLET | Refills: 1 | Status: SHIPPED | OUTPATIENT
Start: 2024-09-24 | End: 2024-11-23

## 2024-09-24 RX ORDER — CHLORTHALIDONE 25 MG/1
25 TABLET ORAL DAILY
Qty: 90 TABLET | Refills: 1 | Status: SHIPPED | OUTPATIENT
Start: 2024-09-24

## 2024-09-24 NOTE — PROGRESS NOTES
Subjective   Patient ID: Cinthya Simmons is a 54 y.o. female who presents for Follow-up.    HPI    Patient is a 54-year-old female with past medical history of hypertension obesity and history of smoking presents for follow-up        Hypertension currently well-controlled on chlorthalidone.  Needs refills of the medications.  No headache changes in vision orthopnea.  Compliant with medications    Obesity.  Patient has been trying to lose weight unsuccessfully.  She been trying to exercise and decrease her caloric intake but no significant reduction in weight.  She is interested in a GLP-1 but not covered by the insurance.  She is interested in alternate medications.  She has tried exercising and decreasing her caloric intake without significant improvement.  She is trying to increase her exercise so that she is exercising 115 minutes a week.    Review of Systems  Constitutional: No fever or chills  Cardiovascular: no chest pain, no palpitations and no syncope.   Respiratory: no cough, no shortness of breath during exertion and no shortness of breath at rest.   Gastrointestinal: no abdominal pain, no nausea and no vomiting.  Neuro: No Headache, no dizziness    Objective   /83   Pulse 62   Wt 113 kg (250 lb)   SpO2 94%   BMI 42.91 kg/m²     Physical Exam  Constitutional: Alert and in no acute distress. Well developed, well nourished  Head and Face: Head and face: Normal.    Cardiovascular: Heart rate and rhythm were normal, normal S1 and S2. No peripheral edema.   Pulmonary: No respiratory distress. Clear bilateral breath sounds.  Musculoskeletal: Gait and station: Normal. Muscle strength/tone: Normal.   Skin: Normal skin color and pigmentation, normal skin turgor, and no rash.    Psychiatric: Judgment and insight: Intact. Mood and affect: Normal.    Procedures    Lab Results   Component Value Date    WBC 8.3 09/25/2023    HGB 13.8 09/25/2023    HCT 41.2 09/25/2023     09/25/2023    CHOL 230 (H)  09/25/2023    TRIG 109 09/25/2023    HDL 57.7 09/25/2023    ALT 20 09/25/2023    AST 15 09/25/2023     09/25/2023    K 3.7 09/25/2023     09/25/2023    CREATININE 0.74 09/25/2023    BUN 19 09/25/2023    CO2 27 09/25/2023    TSH 1.15 09/25/2023    HGBA1C 5.2 09/25/2023       CT lung screening low dose  Narrative: Interpreted By:  Trev Painter,   STUDY:  CT LUNG SCREENING LOW DOSE;  5/29/2024 10:55 am      INDICATION:  Signs/Symptoms:former smoker.      COMPARISON:  None.      ACCESSION NUMBER(S):  [id number]      ORDERING CLINICIAN:  SHAUNA MCGARRY      TECHNIQUE:  Helical data acquisition of the chest was obtained without IV  contrast material.  Images were reformatted in axial, coronal, and  sagittal planes.      FINDINGS:  LUNGS AND AIRWAYS:  The trachea and central airways are patent. No endobronchial lesion  is seen.      There is no focal consolidation, pleural effusion, or pneumothorax.      There are no suspicious lung nodules.  There is mild right middle lobe and lingular atelectasis.  There is right basilar subpleural ground-glass opacities most likely  atelectatic in nature.      MEDIASTINUM AND TEENA, LOWER NECK AND AXILLA:  The visualized thyroid gland is within normal limits.  Scattered mediastinal lymph nodes are felt to be  reactive/inflammatory in nature. Scattered axillary lymph nodes are  felt to be reactive in nature. Esophagus appears within normal limits  as seen.      HEART AND VESSELS:  The thoracic aorta normal in course and caliber.  Main pulmonary artery and its branches are normal in caliber.  No coronary artery calcifications are seen. Please note, the study is  not optimized for evaluation of coronary arteries. The cardiac  chambers are not enlarged.There is moderate mitral annular  calcifications. There is no pericardial effusion seen.      UPPER ABDOMEN:  The visualized subdiaphragmatic structures demonstrate no remarkable  findings.              CHEST WALL AND OSSEOUS  STRUCTURES:  Chest wall is within normal limits.  No acute osseous pathology.There are no suspicious osseous lesions.      Impression: 1. There are no suspicious parenchymal lung nodules  2. Recommend continued 1 year low-dose chest CT for surveillance.          LUNG RADS CATEGORY:  Lung Rad: Lung-RADS 1 (Negative)      Recommendation: Continue annual screening with Low Dose Chest CT in  12 months, recommended as per [employer]  Guidelines Lung-RADS Version 2022.          MACRO:  None      Signed by: Trev Painter 5/29/2024 7:05 PM  Dictation workstation:   [id number]            Assessment/Plan   Problem List Items Addressed This Visit             ICD-10-CM    Essential hypertension, benign - Primary I10     Controlled on current medications.  No medication adjustments.  Encouraged weight reduction and salt reduction         Relevant Medications    chlorthalidone (Hygroton) 25 mg tablet    Other Relevant Orders    Follow Up In [facility] - PCP - Health Maintenance     Other Visit Diagnoses         Codes    Screening mammogram for breast cancer     Z12.31    Relevant Orders    BI mammo bilateral screening tomosynthesis    [facility] care maintenance     Z00.00    Relevant Orders    CBC and Auto Differential    Comprehensive metabolic panel    Lipid Panel    TSH with reflex to Free T4 if abnormal    Class 3 severe obesity with serious comorbidity and body mass index (BMI) of 40.0 to 44.9 in adult, unspecified obesity type (Multi)     E66.01, Z68.41    Relevant Medications    buPROPion XL (Wellbutrin XL) 150 mg 24 hr tablet    Other Relevant Orders    Follow Up In [facility] - PCP - [facility]

## 2024-09-24 NOTE — ASSESSMENT & PLAN NOTE
Controlled on current medications.  No medication adjustments.  Encouraged weight reduction and salt reduction

## 2024-11-01 DIAGNOSIS — N95.1 SYMPTOMATIC MENOPAUSAL OR FEMALE CLIMACTERIC STATES: ICD-10-CM

## 2024-11-04 ENCOUNTER — APPOINTMENT (OUTPATIENT)
Dept: RADIOLOGY | Facility: HOSPITAL | Age: 54
End: 2024-11-04
Payer: COMMERCIAL

## 2024-11-04 RX ORDER — PROGESTERONE 100 MG/1
100 CAPSULE ORAL DAILY
Qty: 90 CAPSULE | Refills: 2 | Status: SHIPPED | OUTPATIENT
Start: 2024-11-04

## 2024-11-08 ENCOUNTER — APPOINTMENT (OUTPATIENT)
Dept: RADIOLOGY | Facility: HOSPITAL | Age: 54
End: 2024-11-08
Payer: COMMERCIAL

## 2024-11-14 ENCOUNTER — APPOINTMENT (OUTPATIENT)
Dept: RADIOLOGY | Facility: HOSPITAL | Age: 54
End: 2024-11-14
Payer: COMMERCIAL

## 2024-11-15 ENCOUNTER — APPOINTMENT (OUTPATIENT)
Dept: RADIOLOGY | Facility: HOSPITAL | Age: 54
End: 2024-11-15
Payer: COMMERCIAL

## 2024-11-15 DIAGNOSIS — E66.813 CLASS 3 SEVERE OBESITY WITH SERIOUS COMORBIDITY AND BODY MASS INDEX (BMI) OF 40.0 TO 44.9 IN ADULT, UNSPECIFIED OBESITY TYPE: ICD-10-CM

## 2024-11-15 DIAGNOSIS — E66.01 CLASS 3 SEVERE OBESITY WITH SERIOUS COMORBIDITY AND BODY MASS INDEX (BMI) OF 40.0 TO 44.9 IN ADULT, UNSPECIFIED OBESITY TYPE: ICD-10-CM

## 2024-11-16 ENCOUNTER — APPOINTMENT (OUTPATIENT)
Dept: RADIOLOGY | Facility: HOSPITAL | Age: 54
End: 2024-11-16
Payer: COMMERCIAL

## 2024-11-18 RX ORDER — BUPROPION HYDROCHLORIDE 150 MG/1
150 TABLET ORAL EVERY MORNING
Qty: 30 TABLET | Refills: 1 | Status: SHIPPED | OUTPATIENT
Start: 2024-11-18 | End: 2025-01-17

## 2024-11-28 ENCOUNTER — APPOINTMENT (OUTPATIENT)
Dept: RADIOLOGY | Facility: HOSPITAL | Age: 54
End: 2024-11-28
Payer: COMMERCIAL

## 2024-11-28 ENCOUNTER — HOSPITAL ENCOUNTER (EMERGENCY)
Facility: HOSPITAL | Age: 54
Discharge: HOME | End: 2024-11-28
Attending: STUDENT IN AN ORGANIZED HEALTH CARE EDUCATION/TRAINING PROGRAM
Payer: COMMERCIAL

## 2024-11-28 VITALS
RESPIRATION RATE: 18 BRPM | TEMPERATURE: 97.3 F | HEART RATE: 61 BPM | DIASTOLIC BLOOD PRESSURE: 75 MMHG | BODY MASS INDEX: 40.82 KG/M2 | OXYGEN SATURATION: 96 % | WEIGHT: 245 LBS | HEIGHT: 65 IN | SYSTOLIC BLOOD PRESSURE: 137 MMHG

## 2024-11-28 DIAGNOSIS — K80.20 CALCULUS OF GALLBLADDER WITHOUT CHOLECYSTITIS WITHOUT OBSTRUCTION: ICD-10-CM

## 2024-11-28 DIAGNOSIS — E87.6 HYPOKALEMIA: ICD-10-CM

## 2024-11-28 DIAGNOSIS — R07.9 CHEST PAIN, UNSPECIFIED TYPE: Primary | ICD-10-CM

## 2024-11-28 LAB
ALBUMIN SERPL BCP-MCNC: 4.1 G/DL (ref 3.4–5)
ALP SERPL-CCNC: 123 U/L (ref 33–110)
ALT SERPL W P-5'-P-CCNC: 45 U/L (ref 7–45)
ANION GAP SERPL CALC-SCNC: 12 MMOL/L (ref 10–20)
AST SERPL W P-5'-P-CCNC: 76 U/L (ref 9–39)
BASOPHILS # BLD AUTO: 0.02 X10*3/UL (ref 0–0.1)
BASOPHILS NFR BLD AUTO: 0.2 %
BILIRUB SERPL-MCNC: 0.7 MG/DL (ref 0–1.2)
BNP SERPL-MCNC: 29 PG/ML (ref 0–99)
BUN SERPL-MCNC: 16 MG/DL (ref 6–23)
CALCIUM SERPL-MCNC: 9.2 MG/DL (ref 8.6–10.3)
CARDIAC TROPONIN I PNL SERPL HS: 3 NG/L (ref 0–13)
CARDIAC TROPONIN I PNL SERPL HS: <3 NG/L (ref 0–13)
CHLORIDE SERPL-SCNC: 102 MMOL/L (ref 98–107)
CO2 SERPL-SCNC: 27 MMOL/L (ref 21–32)
CREAT SERPL-MCNC: 0.77 MG/DL (ref 0.5–1.05)
CREAT SERPL-MCNC: 0.77 MG/DL (ref 0.5–1.05)
EGFRCR SERPLBLD CKD-EPI 2021: >90 ML/MIN/1.73M*2
EGFRCR SERPLBLD CKD-EPI 2021: >90 ML/MIN/1.73M*2
EOSINOPHIL # BLD AUTO: 0.12 X10*3/UL (ref 0–0.7)
EOSINOPHIL NFR BLD AUTO: 1.5 %
ERYTHROCYTE [DISTWIDTH] IN BLOOD BY AUTOMATED COUNT: 13.1 % (ref 11.5–14.5)
FLUAV RNA RESP QL NAA+PROBE: NOT DETECTED
FLUBV RNA RESP QL NAA+PROBE: NOT DETECTED
GLUCOSE SERPL-MCNC: 123 MG/DL (ref 74–99)
HCT VFR BLD AUTO: 42.2 % (ref 36–46)
HGB BLD-MCNC: 14.3 G/DL (ref 12–16)
IMM GRANULOCYTES # BLD AUTO: 0.05 X10*3/UL (ref 0–0.7)
IMM GRANULOCYTES NFR BLD AUTO: 0.6 % (ref 0–0.9)
LIPASE SERPL-CCNC: 25 U/L (ref 9–82)
LYMPHOCYTES # BLD AUTO: 1.56 X10*3/UL (ref 1.2–4.8)
LYMPHOCYTES NFR BLD AUTO: 19.1 %
MAGNESIUM SERPL-MCNC: 1.82 MG/DL (ref 1.6–2.4)
MCH RBC QN AUTO: 30.2 PG (ref 26–34)
MCHC RBC AUTO-ENTMCNC: 33.9 G/DL (ref 32–36)
MCV RBC AUTO: 89 FL (ref 80–100)
MONOCYTES # BLD AUTO: 0.44 X10*3/UL (ref 0.1–1)
MONOCYTES NFR BLD AUTO: 5.4 %
NEUTROPHILS # BLD AUTO: 5.97 X10*3/UL (ref 1.2–7.7)
NEUTROPHILS NFR BLD AUTO: 73.2 %
NRBC BLD-RTO: 0 /100 WBCS (ref 0–0)
PLATELET # BLD AUTO: 322 X10*3/UL (ref 150–450)
POTASSIUM SERPL-SCNC: 3.2 MMOL/L (ref 3.5–5.3)
PROT SERPL-MCNC: 6.9 G/DL (ref 6.4–8.2)
RBC # BLD AUTO: 4.73 X10*6/UL (ref 4–5.2)
SARS-COV-2 RNA RESP QL NAA+PROBE: NOT DETECTED
SODIUM SERPL-SCNC: 138 MMOL/L (ref 136–145)
WBC # BLD AUTO: 8.2 X10*3/UL (ref 4.4–11.3)

## 2024-11-28 PROCEDURE — 80053 COMPREHEN METABOLIC PANEL: CPT

## 2024-11-28 PROCEDURE — 83735 ASSAY OF MAGNESIUM: CPT | Performed by: STUDENT IN AN ORGANIZED HEALTH CARE EDUCATION/TRAINING PROGRAM

## 2024-11-28 PROCEDURE — 74177 CT ABD & PELVIS W/CONTRAST: CPT | Performed by: RADIOLOGY

## 2024-11-28 PROCEDURE — 36415 COLL VENOUS BLD VENIPUNCTURE: CPT

## 2024-11-28 PROCEDURE — 84484 ASSAY OF TROPONIN QUANT: CPT | Mod: 91

## 2024-11-28 PROCEDURE — 71046 X-RAY EXAM CHEST 2 VIEWS: CPT

## 2024-11-28 PROCEDURE — 83690 ASSAY OF LIPASE: CPT

## 2024-11-28 PROCEDURE — 85025 COMPLETE CBC W/AUTO DIFF WBC: CPT

## 2024-11-28 PROCEDURE — 83880 ASSAY OF NATRIURETIC PEPTIDE: CPT

## 2024-11-28 PROCEDURE — 2550000001 HC RX 255 CONTRASTS: Performed by: STUDENT IN AN ORGANIZED HEALTH CARE EDUCATION/TRAINING PROGRAM

## 2024-11-28 PROCEDURE — 2500000001 HC RX 250 WO HCPCS SELF ADMINISTERED DRUGS (ALT 637 FOR MEDICARE OP)

## 2024-11-28 PROCEDURE — 71046 X-RAY EXAM CHEST 2 VIEWS: CPT | Mod: FOREIGN READ | Performed by: RADIOLOGY

## 2024-11-28 PROCEDURE — 74177 CT ABD & PELVIS W/CONTRAST: CPT

## 2024-11-28 PROCEDURE — 84484 ASSAY OF TROPONIN QUANT: CPT

## 2024-11-28 PROCEDURE — 82565 ASSAY OF CREATININE: CPT | Performed by: STUDENT IN AN ORGANIZED HEALTH CARE EDUCATION/TRAINING PROGRAM

## 2024-11-28 PROCEDURE — 99284 EMERGENCY DEPT VISIT MOD MDM: CPT | Mod: 25

## 2024-11-28 PROCEDURE — 87636 SARSCOV2 & INF A&B AMP PRB: CPT

## 2024-11-28 RX ORDER — POTASSIUM CHLORIDE 1.5 G/1.58G
40 POWDER, FOR SOLUTION ORAL ONCE
Status: COMPLETED | OUTPATIENT
Start: 2024-11-28 | End: 2024-11-28

## 2024-11-28 RX ADMIN — IOHEXOL 75 ML: 350 INJECTION, SOLUTION INTRAVENOUS at 11:36

## 2024-11-28 RX ADMIN — POTASSIUM CHLORIDE 40 MEQ: 1.5 POWDER, FOR SOLUTION ORAL at 11:24

## 2024-11-28 ASSESSMENT — PAIN DESCRIPTION - DESCRIPTORS: DESCRIPTORS: DISCOMFORT

## 2024-11-28 ASSESSMENT — COLUMBIA-SUICIDE SEVERITY RATING SCALE - C-SSRS
6. HAVE YOU EVER DONE ANYTHING, STARTED TO DO ANYTHING, OR PREPARED TO DO ANYTHING TO END YOUR LIFE?: NO
2. HAVE YOU ACTUALLY HAD ANY THOUGHTS OF KILLING YOURSELF?: NO
1. IN THE PAST MONTH, HAVE YOU WISHED YOU WERE DEAD OR WISHED YOU COULD GO TO SLEEP AND NOT WAKE UP?: NO

## 2024-11-28 ASSESSMENT — PAIN DESCRIPTION - PAIN TYPE: TYPE: ACUTE PAIN

## 2024-11-28 ASSESSMENT — PAIN DESCRIPTION - LOCATION: LOCATION: CHEST

## 2024-11-28 ASSESSMENT — PAIN - FUNCTIONAL ASSESSMENT: PAIN_FUNCTIONAL_ASSESSMENT: 0-10

## 2024-11-28 ASSESSMENT — PAIN SCALES - GENERAL: PAINLEVEL_OUTOF10: 9

## 2024-11-28 NOTE — ED PROVIDER NOTES
HPI   Chief Complaint   Patient presents with   • Chest Pain   • Abdominal Pain       HPI  Patient is a 54-year-old history of hypertension and smoking presents with abdominal pain and chest pain.  About 4 hours ago, patient says she was drinking coffee and developed severe abdominal pain.  The abdominal pain then radiated to her chest and left arm.  She has been having shortness of breath with tingling of the left arm.  She has has 1 instance of emesis but denies any fever, and chills.  Patient has not been around any sick people.  She has not taking any medication.  Patient is compliant with her hypertensive medication.  Patient has not had any recent surgery.  Patient has not had any long travel.  Patient denies any history of DVT or PE.  Patient chest pain cyst on top of her chest and radiated towards the left arm.  She has not noticed anything that makes it better or worse.  Patient rates the pain a 6 out of 10.  She reports that she normally does not normally consume dairy products but put creamer into her coffee this morning    Patient History   No past medical history on file.  Past Surgical History:   Procedure Laterality Date   • APPENDECTOMY  06/06/2017    Appendectomy     Family History   Problem Relation Name Age of Onset   • Arthritis Mother     • Breast cancer Mother     • Coronary artery disease Father     • COPD Father       Social History     Tobacco Use   • Smoking status: Never     Passive exposure: Never   • Smokeless tobacco: Never   Substance Use Topics   • Alcohol use: Never   • Drug use: Never       Physical Exam   ED Triage Vitals [11/28/24 0944]   Temperature Heart Rate Respirations BP   36.3 °C (97.3 °F) 90 18 161/71      Pulse Ox Temp Source Heart Rate Source Patient Position   96 % Temporal Monitor --      BP Location FiO2 (%)     -- --       Physical Exam  Constitutional:       Appearance: She is well-developed.   HENT:      Head: Normocephalic and atraumatic.   Cardiovascular:       Rate and Rhythm: Normal rate and regular rhythm.      Pulses:           Radial pulses are 2+ on the right side and 2+ on the left side.        Dorsalis pedis pulses are 2+ on the right side and 2+ on the left side.      Heart sounds: Normal heart sounds.   Pulmonary:      Effort: Pulmonary effort is normal.      Breath sounds: Normal breath sounds.   Abdominal:      General: Bowel sounds are normal.      Palpations: Abdomen is soft.      Comments: Epigastric tenderness.  No guarding.  No signs of peritonitis.   Musculoskeletal:         General: Normal range of motion.   Skin:     General: Skin is warm.      Capillary Refill: Capillary refill takes 2 to 3 seconds.   Neurological:      General: No focal deficit present.      Mental Status: She is alert and oriented to person, place, and time.           ED Course & [facility]   ED Course as of 12/01/24 0729   Thu Nov 28, 2024   0955 I have personally reviewed and interpreted the EKG obtained.  Normal sinus rhythm, rate 97 BPM  Normal axis  MN interval and QRS duration within normal limits.  QTc within normal limits.  T wave flattening/inversions inferior and septal leads  No acute ST segment changes   [SS]      ED Course User Index  [SS] Steffen Simerlink, MD         Diagnoses as of 12/01/24 0729   Chest pain, unspecified type   Calculus of gallbladder without cholecystitis without obstruction   Hypokalemia                 No data recorded     [place] Coma Scale Score: 15 (11/28/24 1003 : Kat Weldon RN)                           Medical Decision Making  Patient is a 54-year-old history of hypertension and smoking presents with abdominal pain and chest pain.  Differential diagnoses considered for this patient with myocardial infarction, ACS, pancreatitis, pneumonia and viral infection.  At bedside patient was hemodynamic stable and reserved.  Patient had epigastric tenderness on exam.  Patient EKG showed normal sinus rhythm, no STEMI, no long QT.  Patient troponin  was less than 3 with BNP of 29.  This makes ACS less likely.  Patient CBC was unremarkable.  Patient CMP showed elevated alk phos at 123 with elevation of AST at 76.   Patient also had decreased potassium at 3.2.  Patient potassium was repleted.  Patient CT abdomen showed cholelithiasis but no signs of cholecystitis.  Patient was given referral to surgery in case she keep experiencing the pain.  Return precautions were discussed.  Patient was asked to come back if she experience fever, nausea, and persistent vomiting.  Patient was discharged in stable condition.          Procedure  Procedures     Osmin Diaz MD  Resident  11/28/24 1243    Emergency Medicine Attending Attestation:     The patient was seen by the resident/fellow.  I have personally performed a substantive portion of the encounter.  I have seen and examined the patient; agree with the workup, evaluation, MDM, management and diagnosis.  The care plan has been discussed with the resident; I have reviewed the resident’s note and agree with the documented findings.            ED Course as of 12/01/24 0729   Thu Nov 28, 2024   0955 I have personally reviewed and interpreted the EKG obtained.  Normal sinus rhythm, rate 97 BPM  Normal axis  MS interval and QRS duration within normal limits.  QTc within normal limits.  T wave flattening/inversions inferior and septal leads  No acute ST segment changes   [SS]      ED Course User Index  [SS] Steffen Simerlink, MD         Diagnoses as of 12/01/24 0729   Chest pain, unspecified type   Calculus of gallbladder without cholecystitis without obstruction   Hypokalemia       Steffen Simerlink, MD Steffen Simerlink, MD  12/01/24 0729

## 2024-11-29 ENCOUNTER — HOSPITAL ENCOUNTER (OUTPATIENT)
Dept: RADIOLOGY | Facility: HOSPITAL | Age: 54
Discharge: HOME | End: 2024-11-29
Payer: COMMERCIAL

## 2024-11-29 DIAGNOSIS — R19.00 PELVIC FULLNESS: ICD-10-CM

## 2024-11-29 PROCEDURE — 76856 US EXAM PELVIC COMPLETE: CPT

## 2024-12-02 ENCOUNTER — OFFICE VISIT (OUTPATIENT)
Dept: PRIMARY CARE | Facility: CLINIC | Age: 54
End: 2024-12-02
Payer: COMMERCIAL

## 2024-12-02 VITALS
DIASTOLIC BLOOD PRESSURE: 84 MMHG | OXYGEN SATURATION: 94 % | BODY MASS INDEX: 41.1 KG/M2 | HEART RATE: 70 BPM | SYSTOLIC BLOOD PRESSURE: 135 MMHG | WEIGHT: 247 LBS

## 2024-12-02 DIAGNOSIS — R10.13 EPIGASTRIC PAIN: Primary | ICD-10-CM

## 2024-12-02 PROCEDURE — 1036F TOBACCO NON-USER: CPT | Performed by: INTERNAL MEDICINE

## 2024-12-02 PROCEDURE — 3079F DIAST BP 80-89 MM HG: CPT | Performed by: INTERNAL MEDICINE

## 2024-12-02 PROCEDURE — 99213 OFFICE O/P EST LOW 20 MIN: CPT | Performed by: INTERNAL MEDICINE

## 2024-12-02 PROCEDURE — 3075F SYST BP GE 130 - 139MM HG: CPT | Performed by: INTERNAL MEDICINE

## 2024-12-02 NOTE — ASSESSMENT & PLAN NOTE
Although imaging showed gallstones in the gallbladder patient symptoms are not consistent with biliary colic.  Patient has not been having right upper quadrant abdominal pain that occurs with fatty meals 30 minutes after eating.  Rather she is having epigastric pain radiating up the esophagus.  More than likely she has gastritis.  Would recommend antacid over-the-counter as needed.  Patient has an appointment to see general surgery upcoming.  Avoid ibuprofen, carbonated beverages and caffeine.

## 2024-12-02 NOTE — PROGRESS NOTES
Subjective   Patient ID: Cinthya Simmons is a 54 y.o. female who presents for Follow-up.    HPI    Patient is a 54-year-old female with past medical history of hypertension obesity and history of smoking presents for follow-up    Patient was recently seen in the emergency room due to epigastric pain radiating up to his esophagus.  CT abdomen pelvis obtained showing biliary stones without biliary dilation or cholelithiasis  Patient was advised to follow-up with general surgery.  Her symptoms have resolved.  She denies colicky pain or pain after a heavy cholesterol based meal.    Review of Systems  Constitutional: No fever or chills  Cardiovascular: no chest pain, no palpitations and no syncope.   Respiratory: no cough, no shortness of breath during exertion and no shortness of breath at rest.   Gastrointestinal: no abdominal pain, no nausea and no vomiting.  Neuro: No Headache, no dizziness    Objective   /84   Pulse 70   Wt 112 kg (247 lb)   SpO2 94%   BMI 41.10 kg/m²     Physical Exam  Constitutional: Alert and in no acute distress. Well developed, well nourished  Head and Face: Head and face: Normal.    Cardiovascular: Heart rate and rhythm were normal, normal S1 and S2. No peripheral edema.   Pulmonary: No respiratory distress. Clear bilateral breath sounds.  Musculoskeletal: Gait and station: Normal. Muscle strength/tone: Normal.   Skin: Normal skin color and pigmentation, normal skin turgor, and no rash.    Psychiatric: Judgment and insight: Intact. Mood and affect: Normal.    Procedures    Lab Results   Component Value Date    WBC 8.2 11/28/2024    HGB 14.3 11/28/2024    HCT 42.2 11/28/2024     11/28/2024    CHOL 230 (H) 09/25/2023    TRIG 109 09/25/2023    HDL 57.7 09/25/2023    ALT 45 11/28/2024    AST 76 (H) 11/28/2024     11/28/2024    K 3.2 (L) 11/28/2024     11/28/2024    CREATININE 0.77 11/28/2024    CREATININE 0.77 11/28/2024    BUN 16 11/28/2024    CO2 27 11/28/2024     TSH 1.15 09/25/2023    HGBA1C 5.2 09/25/2023       US PELVIS TRANSABDOMINAL WITH TRANSVAGINAL  Narrative: Interpreted By:  Raheem Hinojosa,   STUDY:  US PELVIS TRANSABDOMINAL WITH TRANSVAGINAL;  11/29/2024 12:55 pm      INDICATION:  Signs/Symptoms:adenomyiosis.      ,R19.00 Intra-abdominal and pelvic swelling, mass and lump,  unspecified site      COMPARISON:  03/30/2024.      ACCESSION NUMBER(S):  [id number]      ORDERING CLINICIAN:  CONG KEY      TECHNIQUE:  Multiple multiplanar static gray scale, color and spectral waveform  sonographic images of the pelvis were obtained.  Transabdominal and  endovaginal ultrasound was performed.      FINDINGS:  UTERUS:  Uterus is normal in size with a smooth external contour measuring  9.2 x 4.6 x 4.7 cm.  No discrete myometrial lesion is identified.  Multiple small hypoechoic cysts at the cervix level likely represent  nabothian cysts.      ENDOMETRIUM:  Endometrial complex is  mildly thickened with thickness of  1 cm.      RIGHT ADNEXA:  Right ovary appears normal in size and echotexture measuring 1.6 x  0.9 x 1.3 cm. A small hyperechoic focus of the right ovary likely is  a small dystrophic calcification. Blood flow is demonstrated to the  right ovary. No additional adnexal mass or fluid collection is seen.      LEFT ADNEXA:  Left ovary appears normal in size and echotexture measuring 2.6 x 1.7  x 2.1 cm. Blood flow is demonstrated to the left ovary. No additional  adnexal mass or fluid collection is seen.      CUL DE SAC:  No free fluid is visualized.      Impression: 1.  Thickening of the endometrial complex measuring 1 cm, possibly  representing hyperplasia.  2. Multiple small hypoechoic cysts at the cervix level likely  represent nabothian cysts.  3. Unremarkable appearance of the ovaries with demonstrated blood  flow.      MACRO:  None.      Signed by: Raheem Hinojosa 12/1/2024 9:48 AM  Dictation workstation:   [id number]            Assessment/Plan   Problem  List Items Addressed This Visit             ICD-10-CM    Epigastric pain - Primary R10.13     Although imaging showed gallstones in the gallbladder patient symptoms are not consistent with biliary colic.  Patient has not been having right upper quadrant abdominal pain that occurs with fatty meals 30 minutes after eating.  Rather she is having epigastric pain radiating up the esophagus.  More than likely she has gastritis.  Would recommend antacid over-the-counter as needed.  Patient has an appointment to see general surgery upcoming.  Avoid ibuprofen, carbonated beverages and caffeine.

## 2024-12-04 ENCOUNTER — APPOINTMENT (OUTPATIENT)
Dept: PRIMARY CARE | Facility: CLINIC | Age: 54
End: 2024-12-04
Payer: COMMERCIAL

## 2024-12-05 NOTE — PROGRESS NOTES
FOLLOW-UP VISIT       HISTORY OF PRESENT ILLNESS:   Cinthya Simmons is a 54 y.o. female who presents to me today for informed consent for EMB. She would like to use anxiolytic before IUD placement.    PAST MEDICAL HISTORY:  No past medical history on file.    PAST SURGICAL HISTORY:  Past Surgical History:   Procedure Laterality Date    APPENDECTOMY  06/06/2017    Appendectomy      ALLERGIES:   No Known Allergies     MEDICATIONS:   Medication Documentation Review Audit       Reviewed by William Medel DO (Physician) on 12/02/24 at 1559      Medication Order Taking? Sig Documenting Provider Last Dose Status   buPROPion XL (Wellbutrin XL) 150 mg 24 hr tablet [id number]  Take 1 tablet (150 mg) by mouth once daily in the morning. Do not crush, chew, or split. William Medel DO  Active   chlorthalidone (Hygroton) 25 mg tablet [id number]  Take 1 tablet (25 mg) by mouth once daily. William Medel DO  Active   clobetasol (Temovate) 0.05 % external solution [id number] No 1 Application. Historical Provider, MD Taking Active   estradiol (Vivelle-DOT) 0.075 mg/24 hr patch [id number]  Place 1 patch over 96 hours on the skin 2 times a week. Kasey Garcia MD MPH  Active   polyethylene glycol-electrolytes (Golytely) 420 gram solution [id number] No Begin drinking first half of prep 6pm the night before procedure. Start second half 5 hours before procedure time. Samanta Vitale MD Taking Active   progesterone (Prometrium) 100 mg capsule [id number]  TAKE 1 CAPSULE BY MOUTH ONCE DAILY. Kasey Garcia MD MPH  Active                     SOCIAL HISTORY:  Patient  reports that she has never smoked. She has never been exposed to tobacco smoke. She has never used smokeless tobacco. She reports that she does not drink alcohol and does not use drugs.   Social History     Socioeconomic History    Marital status:      Spouse name: Not on file    Number of children: Not on file    Years of education: Not on file    Highest education level: Not on  file   Occupational History    Not on file   Tobacco Use    Smoking status: Never     Passive exposure: Never    Smokeless tobacco: Never   Substance and Sexual Activity    Alcohol use: Never    Drug use: Never    Sexual activity: Not on file   Other Topics Concern    Not on file   Social History Narrative    Not on file     Social Drivers of Health     Financial Resource Strain: Not on file   Food Insecurity: Not on file   Transportation Needs: Not on file   Physical Activity: Not on file   Stress: Not on file   Social Connections: Not on file   Intimate Partner Violence: Not on file   Housing Stability: Not on file       FAMILY HISTORY:  Family History   Problem Relation Name Age of Onset    Arthritis Mother      Breast cancer Mother      Coronary artery disease Father      COPD Father         REVIEW OF SYSTEMS:  Constitutional: Negative for fever and chills. Denies anorexia, weight loss.  Eyes: Negative for visual disturbance.   Respiratory: Negative for shortness of breath.    Cardiovascular: Negative for chest pain.   Gastrointestinal: Negative for nausea and vomiting.   Genitourinary: See interval history above.  Skin: Negative for rash.   Neurological: Negative for dizziness and numbness.   Psychiatric/Behavioral: Negative for confusion and decreased concentration.     PHYSICAL EXAM:  There were no vitals taken for this visit.  Virtual appointment, patient appears well.      Assessment:      1. Symptomatic menopausal or female climacteric states            Cinthya Simmons is a 54 y.o. female with adenomyosis.      Plan:   Prescription for Valium sent to the patient's pharmacy.  Informed consent signed today.   Patient will be scheduled for an EMB at the patient's earliest convenience.       Kasey Garcia MD MPH     Scribe Attestation  By signing my name below, IPascale, Scribe   attest that this documentation has been prepared under the direction and in the presence of Kasey Garcia MD MPH.

## 2024-12-06 ENCOUNTER — TELEMEDICINE (OUTPATIENT)
Dept: UROLOGY | Facility: CLINIC | Age: 54
End: 2024-12-06
Payer: COMMERCIAL

## 2024-12-06 ENCOUNTER — APPOINTMENT (OUTPATIENT)
Dept: PRIMARY CARE | Facility: CLINIC | Age: 54
End: 2024-12-06
Payer: COMMERCIAL

## 2024-12-06 DIAGNOSIS — N95.1 SYMPTOMATIC MENOPAUSAL OR FEMALE CLIMACTERIC STATES: ICD-10-CM

## 2024-12-06 PROCEDURE — 99213 OFFICE O/P EST LOW 20 MIN: CPT | Performed by: OBSTETRICS & GYNECOLOGY

## 2024-12-06 RX ORDER — DIAZEPAM 2 MG/1
TABLET ORAL
Qty: 1 TABLET | Refills: 0 | Status: SHIPPED | OUTPATIENT
Start: 2024-12-06

## 2024-12-13 ENCOUNTER — APPOINTMENT (OUTPATIENT)
Dept: UROLOGY | Facility: CLINIC | Age: 54
End: 2024-12-13
Payer: COMMERCIAL

## 2024-12-13 VITALS
DIASTOLIC BLOOD PRESSURE: 79 MMHG | TEMPERATURE: 97.7 F | WEIGHT: 244 LBS | SYSTOLIC BLOOD PRESSURE: 124 MMHG | HEART RATE: 69 BPM | BODY MASS INDEX: 41.66 KG/M2 | HEIGHT: 64 IN

## 2024-12-13 DIAGNOSIS — N95.0 POST-MENOPAUSAL BLEEDING: ICD-10-CM

## 2024-12-13 PROCEDURE — 88305 TISSUE EXAM BY PATHOLOGIST: CPT

## 2024-12-13 PROCEDURE — 88305 TISSUE EXAM BY PATHOLOGIST: CPT | Performed by: STUDENT IN AN ORGANIZED HEALTH CARE EDUCATION/TRAINING PROGRAM

## 2024-12-13 PROCEDURE — 0753T DGTZ GLS MCRSCP SLD LEVEL IV: CPT

## 2024-12-13 PROCEDURE — 58100 BIOPSY OF UTERUS LINING: CPT | Performed by: OBSTETRICS & GYNECOLOGY

## 2024-12-13 ASSESSMENT — PAIN SCALES - GENERAL: PAINLEVEL_OUTOF10: 0-NO PAIN

## 2024-12-13 NOTE — PROGRESS NOTES
FOLLOW-UP VISIT       HISTORY OF PRESENT ILLNESS:   Cinthya Simmons is a 54 y.o. female who presents to me today for follow-up of postmenopausal bleeding and for an endometrial biopsy.    Patient reports light spotting that started one week ago. The patient has stopped MHT in case this is the cause for her bleeding.     PAST MEDICAL HISTORY:  No past medical history on file.    PAST SURGICAL HISTORY:  Past Surgical History:   Procedure Laterality Date    APPENDECTOMY  06/06/2017    Appendectomy        ALLERGIES:   No Known Allergies     MEDICATIONS:   Medication Documentation Review Audit       Reviewed by Galina Menezes MA (Medical Assistant) on 12/13/24 at 1310      Medication Order Taking? Sig Documenting Provider Last Dose Status   buPROPion XL (Wellbutrin XL) 150 mg 24 hr tablet [id number] Yes Take 1 tablet (150 mg) by mouth once daily in the morning. Do not crush, chew, or split. William Medel, DO  Active   chlorthalidone (Hygroton) 25 mg tablet [id number] Yes Take 1 tablet (25 mg) by mouth once daily. William Medel, DO  Active   clobetasol (Temovate) 0.05 % external solution [id number] Yes 1 Application. Mitchell Shipman MD Taking Active   diazePAM (Valium) 2 mg tablet [id number] Yes Take one tablet PO one hour prior to endometrial biopsy Kasey Garcia MD MPH  Active   estradiol (Vivelle-DOT) 0.075 mg/24 hr patch [id number] Yes Place 1 patch over 96 hours on the skin 2 times a week. Kasey Garcia MD MPH  Active   polyethylene glycol-electrolytes (Golytely) 420 gram solution [id number] Yes Begin drinking first half of prep 6pm the night before procedure. Start second half 5 hours before procedure time. Samanta Vitale MD Taking Active   progesterone (Prometrium) 100 mg capsule [id number] Yes TAKE 1 CAPSULE BY MOUTH ONCE DAILY. Kasey Garcia MD MPH  Active                     SOCIAL HISTORY:  Patient  reports that she has never smoked. She has never been exposed to tobacco smoke. She has never used smokeless  "tobacco. She reports that she does not drink alcohol and does not use drugs.   Social History     Socioeconomic History    Marital status:      Spouse name: Not on file    Number of children: Not on file    Years of education: Not on file    Highest education level: Not on file   Occupational History    Not on file   Tobacco Use    Smoking status: Never     Passive exposure: Never    Smokeless tobacco: Never   Substance and Sexual Activity    Alcohol use: Never    Drug use: Never    Sexual activity: Not on file   Other Topics Concern    Not on file   Social History Narrative    Not on file     Social Drivers of Health     Financial Resource Strain: Not on file   Food Insecurity: Not on file   Transportation Needs: Not on file   Physical Activity: Not on file   Stress: Not on file   Social Connections: Not on file   Intimate Partner Violence: Not on file   Housing Stability: Not on file       FAMILY HISTORY:  Family History   Problem Relation Name Age of Onset    Arthritis Mother      Breast cancer Mother      Coronary artery disease Father      COPD Father         REVIEW OF SYSTEMS:  Constitutional: Negative for fever and chills. Denies anorexia, weight loss.  Eyes: Negative for visual disturbance.   Respiratory: Negative for shortness of breath.    Cardiovascular: Negative for chest pain.   Gastrointestinal: Negative for nausea and vomiting.   Genitourinary: See interval history above.  Skin: Negative for rash.   Neurological: Negative for dizziness and numbness.   Psychiatric/Behavioral: Negative for confusion and decreased concentration.     PHYSICAL EXAM:  Blood pressure 124/79, pulse 69, temperature 36.5 °C (97.7 °F), height 1.626 m (5' 4\"), weight 111 kg (244 lb).  Constitutional: Patient appears well-developed and well-nourished. No distress.    Head: Normocephalic and atraumatic.    Neck: Normal range of motion.    Cardiovascular: Normal rate.    Pulmonary/Chest: Effort normal. No respiratory " distress.   Musculoskeletal: Normal range of motion.    Neurological: Alert and oriented to person, place, and time.  Psychiatric: Normal mood and affect. Behavior is normal. Thought content normal.      Procedure:  After consent was obtained, speculum was placed gently into the vagina and cervix was identified.  There was a moderate amount of blood in the vault. Cervix was swabbed with Betadine and the anterior lip of the cervix was gently grasped with a ring forcep.  I had to reposition the tenaculum to grasp the posterior aspect of the cervix in order to get the pipelle in. Endometrial Pipelle was gently inserted into the cervical os into the uterine cavity and vacuum was activated by withdrawing the Pipelle and gently turning and moving back and forth to sample the endometrial lining.  This was done in 2 passes and specimen was sent to pathology.  The patient tolerated the procedure well and there were no complications.       Assessment:      1. Post-menopausal bleeding            Cinthya Simmons is a 54 y.o. female with postmenopausal bleeding.      Plan:   Follow-up after endometrial biopsy results.     Kasey Garcia MD MPH      Scribe Attestation  By signing my name below, IPascale, Scribe   attest that this documentation has been prepared under the direction and in the presence of Kasey Garcia MD MPH.

## 2024-12-19 LAB
LABORATORY COMMENT REPORT: NORMAL
PATH REPORT.FINAL DX SPEC: NORMAL
PATH REPORT.GROSS SPEC: NORMAL
PATH REPORT.RELEVANT HX SPEC: NORMAL
PATH REPORT.TOTAL CANCER: NORMAL

## 2024-12-20 ENCOUNTER — APPOINTMENT (OUTPATIENT)
Dept: SURGERY | Facility: CLINIC | Age: 54
End: 2024-12-20
Payer: COMMERCIAL

## 2024-12-27 ENCOUNTER — HOSPITAL ENCOUNTER (OUTPATIENT)
Dept: RADIOLOGY | Facility: CLINIC | Age: 54
Discharge: HOME | End: 2024-12-27
Payer: COMMERCIAL

## 2024-12-27 VITALS — BODY MASS INDEX: 41.78 KG/M2 | HEIGHT: 64 IN | WEIGHT: 244.71 LBS

## 2024-12-27 DIAGNOSIS — Z12.31 SCREENING MAMMOGRAM FOR BREAST CANCER: ICD-10-CM

## 2024-12-27 PROCEDURE — 77067 SCR MAMMO BI INCL CAD: CPT

## 2025-01-03 ENCOUNTER — APPOINTMENT (OUTPATIENT)
Dept: PRIMARY CARE | Facility: CLINIC | Age: 55
End: 2025-01-03
Payer: COMMERCIAL

## 2025-01-03 VITALS
HEART RATE: 69 BPM | BODY MASS INDEX: 42.03 KG/M2 | DIASTOLIC BLOOD PRESSURE: 82 MMHG | SYSTOLIC BLOOD PRESSURE: 124 MMHG | OXYGEN SATURATION: 94 % | WEIGHT: 245 LBS

## 2025-01-03 DIAGNOSIS — E66.01 CLASS 3 SEVERE OBESITY WITH SERIOUS COMORBIDITY AND BODY MASS INDEX (BMI) OF 40.0 TO 44.9 IN ADULT, UNSPECIFIED OBESITY TYPE: ICD-10-CM

## 2025-01-03 DIAGNOSIS — Z00.00 HEALTH CARE MAINTENANCE: Primary | ICD-10-CM

## 2025-01-03 DIAGNOSIS — E66.813 CLASS 3 SEVERE OBESITY WITH SERIOUS COMORBIDITY AND BODY MASS INDEX (BMI) OF 40.0 TO 44.9 IN ADULT, UNSPECIFIED OBESITY TYPE: ICD-10-CM

## 2025-01-03 DIAGNOSIS — K29.30 CHRONIC SUPERFICIAL GASTRITIS WITHOUT BLEEDING: ICD-10-CM

## 2025-01-03 DIAGNOSIS — I10 ESSENTIAL HYPERTENSION, BENIGN: ICD-10-CM

## 2025-01-03 PROCEDURE — 99213 OFFICE O/P EST LOW 20 MIN: CPT | Performed by: INTERNAL MEDICINE

## 2025-01-03 PROCEDURE — 1036F TOBACCO NON-USER: CPT | Performed by: INTERNAL MEDICINE

## 2025-01-03 PROCEDURE — 3074F SYST BP LT 130 MM HG: CPT | Performed by: INTERNAL MEDICINE

## 2025-01-03 PROCEDURE — 3079F DIAST BP 80-89 MM HG: CPT | Performed by: INTERNAL MEDICINE

## 2025-01-03 RX ORDER — ICOSAPENT ETHYL 1 G/1
2 CAPSULE ORAL
Qty: 120 CAPSULE | Refills: 11 | Status: SHIPPED | OUTPATIENT
Start: 2025-01-03 | End: 2026-01-03

## 2025-01-03 NOTE — PROGRESS NOTES
Subjective   Patient ID: Cinthya Simmons is a 54 y.o. female who presents for Follow-up.    HPI     Patient is a 54-year-old female with past medical history of hypertension and gastritis presents for follow-up.  Last visit there was concern she may have gastritis.  She started on omeprazole over-the-counter.  Her symptoms have resolved.  No further complaints.    With the rest of her blood pressure her blood pressure is well-controlled on current medications no headache changes in vision orthopnea.        Review of Systems  Constitutional: No fever or chills  Cardiovascular: no chest pain, no palpitations and no syncope.   Respiratory: no cough, no shortness of breath during exertion and no shortness of breath at rest.   Gastrointestinal: no abdominal pain, no nausea and no vomiting.  Neuro: No Headache, no dizziness    Objective   /82   Pulse 69   Wt 111 kg (245 lb)   SpO2 94%   BMI 42.03 kg/m²     Physical Exam  Constitutional: Alert and in no acute distress. Well developed, well nourished  Head and Face: Head and face: Normal.    Cardiovascular: Heart rate and rhythm were normal, normal S1 and S2. No peripheral edema.   Pulmonary: No respiratory distress. Clear bilateral breath sounds.  Musculoskeletal: Gait and station: Normal. Muscle strength/tone: Normal.   Skin: Normal skin color and pigmentation, normal skin turgor, and no rash.    Psychiatric: Judgment and insight: Intact. Mood and affect: Normal.    Procedures    Lab Results   Component Value Date    WBC 8.2 11/28/2024    HGB 14.3 11/28/2024    HCT 42.2 11/28/2024     11/28/2024    CHOL 230 (H) 09/25/2023    TRIG 109 09/25/2023    HDL 57.7 09/25/2023    ALT 45 11/28/2024    AST 76 (H) 11/28/2024     11/28/2024    K 3.2 (L) 11/28/2024     11/28/2024    CREATININE 0.77 11/28/2024    CREATININE 0.77 11/28/2024    BUN 16 11/28/2024    CO2 27 11/28/2024    TSH 1.15 09/25/2023    HGBA1C 5.2 09/25/2023       BI mammo bilateral  screening tomosynthesis  Narrative: Interpreted By:  Sarina Morgan,   STUDY:  BI MAMMO BILATERAL SCREENING TOMOSYNTHESIS;  12/27/2024 9:36 am      ACCESSION NUMBER(S):  [id number]      ORDERING CLINICIAN:  SHAUNA MCGARRY      INDICATION:  Screening.      COMPARISON:  All prior mammograms that are available at the time of interpretation.      FINDINGS:  2D and tomosynthesis images were reviewed at 1 mm slice thickness.      Density:  There are scattered areas of fibroglandular density.      No suspicious masses or calcifications are identified.      Impression: No mammographic evidence of malignancy.      BI-RADS CATEGORY:  BI-RADS Category:  1 Negative.  Recommendation:  Annual Screening.  Recommended Date:  1 Year.  Laterality:  Bilateral.      Based on the Tyrer-Cuzick model for breast cancer risk assessment,  this patient is at an elevated risk of developing breast cancer and  may benefit from additional screening with breast MRI or ultrasound.  Please note that this estimate is based on responses provided on the  patient questionnaire. For more information regarding high risk  consultation, please call [phone].      For any future breast imaging appointments, please call [phone]).          MACRO:  None      Signed by: Sarina Morgan 1/2/2025 12:05 PM  Dictation workstation:   [id number]            Assessment/Plan

## 2025-01-03 NOTE — PROGRESS NOTES
Subjective   Patient ID: Cinthya Simmons is a 54 y.o. female who presents for Follow-up.    HPI       Patient is a 54-year-old female with past medical history of hypertension and gastritis presents for follow-up.  Last visit there was concern she may have gastritis.  She started on omeprazole over-the-counter.  Her symptoms have resolved.  No further complaints.    With the rest of her blood pressure her blood pressure is well-controlled on current medications no headache changes in vision orthopnea.    Review of Systems  Constitutional: No fever or chills  Cardiovascular: no chest pain, no palpitations and no syncope.   Respiratory: no cough, no shortness of breath during exertion and no shortness of breath at rest.   Gastrointestinal: no abdominal pain, no nausea and no vomiting.  Neuro: No Headache, no dizziness    Objective   /82   Pulse 69   Wt 111 kg (245 lb)   SpO2 94%   BMI 42.03 kg/m²     Physical Exam  Constitutional: Alert and in no acute distress. Well developed, well nourished  Head and Face: Head and face: Normal.    Cardiovascular: Heart rate and rhythm were normal, normal S1 and S2. No peripheral edema.   Pulmonary: No respiratory distress. Clear bilateral breath sounds.  Musculoskeletal: Gait and station: Normal. Muscle strength/tone: Normal.   Skin: Normal skin color and pigmentation, normal skin turgor, and no rash.    Psychiatric: Judgment and insight: Intact. Mood and affect: Normal.    Procedures    Lab Results   Component Value Date    WBC 8.2 11/28/2024    HGB 14.3 11/28/2024    HCT 42.2 11/28/2024     11/28/2024    CHOL 230 (H) 09/25/2023    TRIG 109 09/25/2023    HDL 57.7 09/25/2023    ALT 45 11/28/2024    AST 76 (H) 11/28/2024     11/28/2024    K 3.2 (L) 11/28/2024     11/28/2024    CREATININE 0.77 11/28/2024    CREATININE 0.77 11/28/2024    BUN 16 11/28/2024    CO2 27 11/28/2024    TSH 1.15 09/25/2023    HGBA1C 5.2 09/25/2023       BI mammo bilateral  screening tomosynthesis  Narrative: Interpreted By:  Sarina Morgan,   STUDY:  BI MAMMO BILATERAL SCREENING TOMOSYNTHESIS;  12/27/2024 9:36 am      ACCESSION NUMBER(S):  [id number]      ORDERING CLINICIAN:  SHAUNA MCGARRY      INDICATION:  Screening.      COMPARISON:  All prior mammograms that are available at the time of interpretation.      FINDINGS:  2D and tomosynthesis images were reviewed at 1 mm slice thickness.      Density:  There are scattered areas of fibroglandular density.      No suspicious masses or calcifications are identified.      Impression: No mammographic evidence of malignancy.      BI-RADS CATEGORY:  BI-RADS Category:  1 Negative.  Recommendation:  Annual Screening.  Recommended Date:  1 Year.  Laterality:  Bilateral.      Based on the Tyrer-Cuzick model for breast cancer risk assessment,  this patient is at an elevated risk of developing breast cancer and  may benefit from additional screening with breast MRI or ultrasound.  Please note that this estimate is based on responses provided on the  patient questionnaire. For more information regarding high risk  consultation, please call [phone].      For any future breast imaging appointments, please call [phone]).          MACRO:  None      Signed by: Sarina Morgan 1/2/2025 12:05 PM  Dictation workstation:   [id number]            Assessment/Plan   Problem List Items Addressed This Visit             ICD-10-CM    Essential hypertension, benign I10     Controlled.  Continue current medications as is.  Follow-up 6 months         Relevant Medications    icosapent ethyL (Vascepa) 1 gram capsule    Chronic superficial gastritis without bleeding K29.30     Resolved.  No further workup required          Other Visit Diagnoses         Codes    [facility] maintenance    -  Primary Z00.00    Relevant Medications    icosapent ethyL (Vascepa) 1 gram capsule    Other Relevant Orders    Hepatic function panel    Vitamin D  25-Hydroxy,Total (for eval of Vitamin D levels)    Lipid Panel    Hemoglobin A1C    Class 3 severe obesity with serious comorbidity and body mass index (BMI) of 40.0 to 44.9 in adult, unspecified obesity type     E66.813, E66.01, Z68.41

## 2025-01-10 ENCOUNTER — APPOINTMENT (OUTPATIENT)
Dept: RADIOLOGY | Facility: CLINIC | Age: 55
End: 2025-01-10
Payer: COMMERCIAL

## 2025-02-07 ENCOUNTER — OFFICE VISIT (OUTPATIENT)
Dept: URGENT CARE | Age: 55
End: 2025-02-07
Payer: COMMERCIAL

## 2025-02-07 VITALS
TEMPERATURE: 98.4 F | BODY MASS INDEX: 42.68 KG/M2 | OXYGEN SATURATION: 95 % | RESPIRATION RATE: 17 BRPM | HEIGHT: 64 IN | HEART RATE: 61 BPM | DIASTOLIC BLOOD PRESSURE: 71 MMHG | WEIGHT: 250 LBS | SYSTOLIC BLOOD PRESSURE: 128 MMHG

## 2025-02-07 DIAGNOSIS — B35.1 ONYCHOMYCOSIS: Primary | ICD-10-CM

## 2025-02-07 NOTE — PROGRESS NOTES
"Subjective   History of Present Illness: Cinthya Simmons is a 54 y.o. female. They present today with a chief complaint of Toe Problem (Left big toe possibly has fungus ).  Patient is unsure when she started to have the toe problem.  She took of the nail polish off her toes today then her daughter thought that her toe has possible fungal infection.  She denies symptoms.      Past Medical History  Allergies as of 02/07/2025    (No Known Allergies)       (Not in a hospital admission)       History reviewed. No pertinent past medical history.    Past Surgical History:   Procedure Laterality Date    APPENDECTOMY  06/06/2017    Appendectomy        reports that she has never smoked. She has never been exposed to tobacco smoke. She has never used smokeless tobacco. She reports that she does not drink alcohol and does not use drugs.    Review of Systems  Review of Systems                               Objective    Vitals:    02/07/25 1806   BP: 128/71   BP Location: Left arm   Patient Position: Sitting   BP Cuff Size: Small adult   Pulse: 61   Resp: 17   Temp: 36.9 °C (98.4 °F)   TempSrc: Oral   SpO2: 95%   Weight: 113 kg (250 lb)   Height: 1.626 m (5' 4\")     No LMP recorded. Patient is postmenopausal.    Physical Exam  Vitals reviewed.   HENT:      Head: Normocephalic and atraumatic.      Nose: Nose normal.      Mouth/Throat:      Mouth: Mucous membranes are moist.   Eyes:      Extraocular Movements: Extraocular movements intact.      Conjunctiva/sclera: Conjunctivae normal.   Cardiovascular:      Rate and Rhythm: Normal rate.   Pulmonary:      Effort: Pulmonary effort is normal.   Musculoskeletal:        Feet:    Feet:      Left foot:      Toenail Condition: Left toenails are abnormally thick. Fungal disease present.  Skin:     General: Skin is warm.   Neurological:      Mental Status: She is alert and oriented to person, place, and time.   Psychiatric:         Mood and Affect: Mood normal.         Behavior: Behavior " normal.             Point of Care Test & Imaging Results from this visit  No results found for this visit on 02/07/25.   No results found.    Diagnostic study results (if any) were reviewed by Bro Ramsay PA-C.    Assessment/Plan   Allergies, medications, history, and pertinent labs/EKGs/Imaging reviewed by Bro Ramsay PA-C.     Medical Decision Making  Left first toe onychomycosis.  Referral to podiatrist is requested.  -         Patient is educated about their diagnoses.     -          Discussed medications benefits and adverse effects.     -          Answered all patient’s questions.     -          Patient will call [phone] or go to the nearest ED if worsen symptoms .     -          Patient is agreeable to the plan of care and is deemed stable upon discharge.     -          Follow up with your primary care provider in two days.      Orders and Diagnoses  Diagnoses and all orders for this visit:  Onychomycosis  -     Referral to Podiatry; Future      Medical Admin Record      Patient disposition: Home    Electronically signed by Bro Ramsay PA-C  6:24 PM

## 2025-03-28 ENCOUNTER — APPOINTMENT (OUTPATIENT)
Dept: PRIMARY CARE | Facility: CLINIC | Age: 55
End: 2025-03-28
Payer: COMMERCIAL

## 2025-04-14 DIAGNOSIS — I10 ESSENTIAL HYPERTENSION, BENIGN: ICD-10-CM

## 2025-04-14 RX ORDER — CHLORTHALIDONE 25 MG/1
25 TABLET ORAL DAILY
Qty: 90 TABLET | Refills: 0 | Status: SHIPPED | OUTPATIENT
Start: 2025-04-14

## 2025-05-05 DIAGNOSIS — R23.2 VASOMOTOR FLUSHING: Primary | ICD-10-CM

## 2025-05-05 RX ORDER — ESTRADIOL 0.05 MG/D
1 FILM, EXTENDED RELEASE TRANSDERMAL 2 TIMES WEEKLY
Qty: 8 PATCH | Refills: 5 | Status: SHIPPED | OUTPATIENT
Start: 2025-05-05

## 2025-05-21 NOTE — PROGRESS NOTES
FOLLOW-UP VISIT       HISTORY OF PRESENT ILLNESS:   Cinthya Simmons is a 54 y.o. female who presents to me today for follow-up. Patient was last evaluated on 12/13/24. At that visit, I performed an EMB due to PMB which did not demonstrate concern for cancer at that time. She was encouraged to resume estradiol 0.05 mg patches for MHT.     Still feeling some vaginal dryness and dyspareunia, interested in vaginal estrogen and testosterone for low sex drive. Feels like relationship is good. No issues with distractedness during sexs     PAST MEDICAL HISTORY:  Medical History[1]    PAST SURGICAL HISTORY:  Surgical History[2]     ALLERGIES:   Allergies[3]     MEDICATIONS:   Medication Documentation Review Audit       Reviewed by Kristal Rincon MA (Medical Assistant) on 05/22/25 at 1352      Medication Order Taking? Sig Documenting Provider Last Dose Status     Discontinued 05/22/25 0930   chlorthalidone (Hygroton) 25 mg tablet [id number] Yes Take 1 tablet (25 mg) by mouth once daily. William Medel, DO  Active   clobetasol (Temovate) 0.05 % external solution [id number] Yes 1 Application. Historical Provider, MD Taking Active     Discontinued 05/22/25 0929   estradiol (Vivelle-DOT) 0.05 mg/24 hr patch [id number] Yes Place 1 patch over 96 hours on the skin 2 times a week. China Mcnally MD  Active     Discontinued 05/22/25 0929     Discontinued 05/22/25 0930   icosapent ethyL (Vascepa) 1 gram capsule [id number] Yes Take 2 capsules (2 g) by mouth 2 times daily (morning and late afternoon). William Medel DO  Active   polyethylene glycol-electrolytes (Golytely) 420 gram solution [id number] Yes Begin drinking first half of prep 6pm the night before procedure. Start second half 5 hours before procedure time. Samanta Vitale MD Taking Active   progesterone (Prometrium) 100 mg capsule [id number] Yes TAKE 1 CAPSULE BY MOUTH ONCE DAILY. Kasey Garcia MD MPH  Active                     SOCIAL HISTORY:  Patient  reports that she has never  "smoked. She has never been exposed to tobacco smoke. She has never used smokeless tobacco. She reports that she does not drink alcohol and does not use drugs.   Social History     Socioeconomic History    Marital status:      Spouse name: Not on file    Number of children: Not on file    Years of education: Not on file    Highest education level: Not on file   Occupational History    Not on file   Tobacco Use    Smoking status: Never     Passive exposure: Never    Smokeless tobacco: Never   Substance and Sexual Activity    Alcohol use: Never    Drug use: Never    Sexual activity: Not on file   Other Topics Concern    Not on file   Social History Narrative    Not on file     Social Drivers of Health     Financial Resource Strain: Not on file   Food Insecurity: Not on file   Transportation Needs: Not on file   Physical Activity: Not on file   Stress: Not on file   Social Connections: Not on file   Intimate Partner Violence: Not on file   Housing Stability: Not on file       FAMILY HISTORY:  Family History[4]    REVIEW OF SYSTEMS:  Constitutional: Negative for fever and chills. Denies anorexia, weight loss.  Eyes: Negative for visual disturbance.   Respiratory: Negative for shortness of breath.    Cardiovascular: Negative for chest pain.   Gastrointestinal: Negative for nausea and vomiting.   Genitourinary: See interval history above.  Skin: Negative for rash.   Neurological: Negative for dizziness and numbness.   Psychiatric/Behavioral: Negative for confusion and decreased concentration.     PHYSICAL EXAM:  Blood pressure 140/82, pulse 69, height 1.626 m (5' 4\"), weight 109 kg (241 lb).  Constitutional: Patient appears well-developed and well-nourished. No distress.    Head: Normocephalic and atraumatic.    Neck: Normal range of motion.    Cardiovascular: Normal rate.    Pulmonary/Chest: Effort normal. No respiratory distress.   Musculoskeletal: Normal range of motion.    Neurological: Alert and oriented to " person, place, and time.  Psychiatric: Normal mood and affect. Behavior is normal. Thought content normal.         Assessment:      1. Vasomotor symptoms due to menopause  progesterone (Prometrium) 100 mg capsule    estradiol (Vivelle-Dot) 0.05 mg/24 hr patch      2. Genitourinary syndrome of menopause  estradiol (Estrace) 0.01 % (0.1 mg/gram) vaginal cream      3. Menopausal syndrome on hormone replacement therapy  Testosterone, Total LC-MS/MS    Testosterone, Total LC-MS/MS      4. Hypoactive sexual desire disorder  Testosterone, Total LC-MS/MS    Testosterone, Total LC-MS/MS          Cinthya Simmons is a 54 y.o. female with menopausal hormone therapy, genitourinary syndrome of menopause, and hypoactive sexual desire disorder     Plan:   Continue MHT 0.05mg estradiol and 100mg micronized progesterone  Vaginal estradiol prescribed for GSM  Baseline testosterone labs ordered for HSDD. Discussed Addyi and Vyleesi as well, pt prefers testosterone. Will have CMP reevaluated with labs ordered by PCP, prior LFT elevation in context of active gallstones    China Mcnally MD, Gynecologist  [facility] Fellow  Dept of Urology/OBGYN  5/22/2025      Agree with above  Kasey Garcia MD, MPH          [1] No past medical history on file.  [2]   Past Surgical History:  Procedure Laterality Date    APPENDECTOMY  06/06/2017    Appendectomy   [3] No Known Allergies  [4]   Family History  Problem Relation Name Age of Onset    Arthritis Mother 80     Breast cancer Mother 80     Coronary artery disease Father      COPD Father

## 2025-05-22 ENCOUNTER — APPOINTMENT (OUTPATIENT)
Dept: PRIMARY CARE | Facility: CLINIC | Age: 55
End: 2025-05-22
Payer: COMMERCIAL

## 2025-05-22 ENCOUNTER — OFFICE VISIT (OUTPATIENT)
Dept: UROLOGY | Facility: CLINIC | Age: 55
End: 2025-05-22
Payer: COMMERCIAL

## 2025-05-22 VITALS
SYSTOLIC BLOOD PRESSURE: 140 MMHG | HEART RATE: 69 BPM | DIASTOLIC BLOOD PRESSURE: 82 MMHG | BODY MASS INDEX: 41.15 KG/M2 | WEIGHT: 241 LBS | HEIGHT: 64 IN

## 2025-05-22 VITALS
WEIGHT: 241 LBS | SYSTOLIC BLOOD PRESSURE: 127 MMHG | OXYGEN SATURATION: 94 % | HEART RATE: 71 BPM | BODY MASS INDEX: 41.15 KG/M2 | DIASTOLIC BLOOD PRESSURE: 80 MMHG | HEIGHT: 64 IN

## 2025-05-22 DIAGNOSIS — Z00.00 HEALTH CARE MAINTENANCE: Primary | ICD-10-CM

## 2025-05-22 DIAGNOSIS — Z23 NEED FOR PROPHYLACTIC VACCINATION AGAINST STREPTOCOCCUS PNEUMONIAE (PNEUMOCOCCUS): ICD-10-CM

## 2025-05-22 DIAGNOSIS — I10 ESSENTIAL HYPERTENSION, BENIGN: ICD-10-CM

## 2025-05-22 DIAGNOSIS — N95.1 VASOMOTOR SYMPTOMS DUE TO MENOPAUSE: Primary | ICD-10-CM

## 2025-05-22 DIAGNOSIS — N95.8 GENITOURINARY SYNDROME OF MENOPAUSE: ICD-10-CM

## 2025-05-22 DIAGNOSIS — E66.01 MORBID OBESITY (MULTI): ICD-10-CM

## 2025-05-22 DIAGNOSIS — Z79.890 MENOPAUSAL SYNDROME ON HORMONE REPLACEMENT THERAPY: ICD-10-CM

## 2025-05-22 DIAGNOSIS — N95.1 MENOPAUSAL SYNDROME ON HORMONE REPLACEMENT THERAPY: ICD-10-CM

## 2025-05-22 DIAGNOSIS — F52.0 HYPOACTIVE SEXUAL DESIRE DISORDER: ICD-10-CM

## 2025-05-22 PROCEDURE — 99214 OFFICE O/P EST MOD 30 MIN: CPT | Performed by: OBSTETRICS & GYNECOLOGY

## 2025-05-22 PROCEDURE — 3074F SYST BP LT 130 MM HG: CPT | Performed by: INTERNAL MEDICINE

## 2025-05-22 PROCEDURE — G8433 SCR FOR DEP NOT CPT DOC RSN: HCPCS | Performed by: INTERNAL MEDICINE

## 2025-05-22 PROCEDURE — 90677 PCV20 VACCINE IM: CPT | Performed by: INTERNAL MEDICINE

## 2025-05-22 PROCEDURE — 3079F DIAST BP 80-89 MM HG: CPT | Performed by: OBSTETRICS & GYNECOLOGY

## 2025-05-22 PROCEDURE — 3008F BODY MASS INDEX DOCD: CPT | Performed by: OBSTETRICS & GYNECOLOGY

## 2025-05-22 PROCEDURE — 1036F TOBACCO NON-USER: CPT | Performed by: INTERNAL MEDICINE

## 2025-05-22 PROCEDURE — 99396 PREV VISIT EST AGE 40-64: CPT | Performed by: INTERNAL MEDICINE

## 2025-05-22 PROCEDURE — 3008F BODY MASS INDEX DOCD: CPT | Performed by: INTERNAL MEDICINE

## 2025-05-22 PROCEDURE — 1036F TOBACCO NON-USER: CPT | Performed by: OBSTETRICS & GYNECOLOGY

## 2025-05-22 PROCEDURE — 90471 IMMUNIZATION ADMIN: CPT | Performed by: INTERNAL MEDICINE

## 2025-05-22 PROCEDURE — 3077F SYST BP >= 140 MM HG: CPT | Performed by: OBSTETRICS & GYNECOLOGY

## 2025-05-22 PROCEDURE — 3079F DIAST BP 80-89 MM HG: CPT | Performed by: INTERNAL MEDICINE

## 2025-05-22 RX ORDER — ESTRADIOL 0.05 MG/D
1 FILM, EXTENDED RELEASE TRANSDERMAL 2 TIMES WEEKLY
Qty: 24 PATCH | Refills: 3 | Status: SHIPPED | OUTPATIENT
Start: 2025-05-22 | End: 2026-05-22

## 2025-05-22 RX ORDER — PROGESTERONE 100 MG/1
100 CAPSULE ORAL NIGHTLY
Qty: 90 CAPSULE | Refills: 3 | Status: SHIPPED | OUTPATIENT
Start: 2025-05-22 | End: 2026-05-22

## 2025-05-22 RX ORDER — CHLORTHALIDONE 25 MG/1
25 TABLET ORAL DAILY
Qty: 90 TABLET | Refills: 3 | Status: SHIPPED | OUTPATIENT
Start: 2025-05-22

## 2025-05-22 RX ORDER — ICOSAPENT ETHYL 1 G/1
2 CAPSULE ORAL
Qty: 360 CAPSULE | Refills: 3 | Status: SHIPPED | OUTPATIENT
Start: 2025-05-22 | End: 2026-05-22

## 2025-05-22 RX ORDER — ESTRADIOL 0.1 MG/G
CREAM VAGINAL
Qty: 42.5 G | Refills: 11 | Status: SHIPPED | OUTPATIENT
Start: 2025-05-22 | End: 2026-05-22

## 2025-05-22 ASSESSMENT — PATIENT HEALTH QUESTIONNAIRE - PHQ9
2. FEELING DOWN, DEPRESSED OR HOPELESS: NOT AT ALL
1. LITTLE INTEREST OR PLEASURE IN DOING THINGS: NOT AT ALL
SUM OF ALL RESPONSES TO PHQ9 QUESTIONS 1 AND 2: 0

## 2025-05-22 NOTE — ASSESSMENT & PLAN NOTE
Adequately controlled.  Continue current medications.  No medication adjustments.  Advised low-salt diet and weight reduction as you are doing

## 2025-05-22 NOTE — PROGRESS NOTES
"Subjective   Patient ID: Cinthya Simmons is a 54 y.o. female who presents for Annual Exam.        HPI     Patient is a 54-year-old female with past medical history of hypertension dyslipidemia recently who presents for wellness examination.  Patient is  and has a 19-year-old child at home.  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  Overall happy.  Has lost 9 pounds since last visit.  Does not drink alcohol.  No illicit substances or smoking.  No complaints at this time.  No recent hospitalizations.  She is on hormone replacement therapy per gynecology for treatment of her perimenopausal symptoms.  No significant exercise.  She tries to eat a well-balanced healthy diet.  She is due for pneumonia and shingles vaccine.  She plans on getting the pneumonia shot here today but is scheduled for her shingles shot tomorrow at the pharmacy.    No changes in sleeping habits or eating habits.    Review of Systems  Constitutional: No fever or chills, No Night Sweats  Eyes: No Blurry Vision or Eye sight problems  ENT: No Nasal Discharge, Hoarseness, sore throat  Cardiovascular: no chest pain, no palpitations and no syncope.   Respiratory: no cough, no shortness of breath during exertion and no shortness of breath at rest.   Gastrointestinal: no abdominal pain, no nausea and no vomiting.   : No vaginal discharge, burning with urination, no blood in urine or stools  Skin: No Skin rashes or Lesions  Neuro: No Headache, no dizziness or Numbness or tingling  Psych: No Anxiety, depression or sleeping problems  Heme: No Easy bleeding or brusing.     Objective   /80   Pulse 71   Ht 1.626 m (5' 4\")   Wt 109 kg (241 lb)   SpO2 94%   BMI 41.37 kg/m²     Physical Exam  Constitutional: Alert and in no acute distress. Well developed, well nourished.   Head and Face: Head and face: Normal.    Eyes: Normal external exam. Pupils were equal in size, round, reactive to light (PERRL) with normal accommodation and extraocular " movements intact (EOMI).   Ears, Nose, Mouth, and Throat: External inspection of ears and nose: Normal.  Hearing: Normal.  Nasal mucosa, septum, and turbinates: Normal.  Lips, teeth, and gums: Normal.  Oropharynx: Normal.   Neck: No neck mass was observed. Supple. Thyroid not enlarged and there were no palpable thyroid nodules.   Cardiovascular: Heart rate and rhythm were normal, normal S1 and S2. Pedal pulses: Normal. No peripheral edema.   Pulmonary: No respiratory distress. Clear bilateral breath sounds.   Breast: Normal Appearance, No Masses or lumps palpated  Abdomen: Soft nontender; no abdominal mass palpated. Normal bowel sounds. No organomegaly.   : Deferred   Musculoskeletal: No joint swelling seen, normal movements of all extremities. Range of motion: Normal.  Muscle strength/tone: Normal.    Skin: Normal skin color and pigmentation, normal skin turgor, and no rash.   Neurologic: Deep tendon reflexes were 2+ and symmetric.   Psychiatric: Judgment and insight: Intact. Mood and affect: Normal.  Lymphatic: No cervical lymphadenopathy. Palpation of lymph nodes in axillae: Normal.  Palpation of lymph nodes in groin: Normal.    Lab Results   Component Value Date    WBC 8.2 11/28/2024    HGB 14.3 11/28/2024    HCT 42.2 11/28/2024     11/28/2024    CHOL 230 (H) 09/25/2023    TRIG 109 09/25/2023    HDL 57.7 09/25/2023    ALT 45 11/28/2024    AST 76 (H) 11/28/2024     11/28/2024    K 3.2 (L) 11/28/2024     11/28/2024    CREATININE 0.77 11/28/2024    CREATININE 0.77 11/28/2024    BUN 16 11/28/2024    CO2 27 11/28/2024    TSH 1.15 09/25/2023    HGBA1C 5.2 09/25/2023       BI mammo bilateral screening tomosynthesis  Narrative: Interpreted By:  Sarina Morgan,   STUDY:  BI MAMMO BILATERAL SCREENING TOMOSYNTHESIS;  12/27/2024 9:36 am      ACCESSION NUMBER(S):  [id number]      ORDERING CLINICIAN:  SHAUNA MCGARRY      INDICATION:  Screening.      COMPARISON:  All prior mammograms that are available  at the time of interpretation.      FINDINGS:  2D and tomosynthesis images were reviewed at 1 mm slice thickness.      Density:  There are scattered areas of fibroglandular density.      No suspicious masses or calcifications are identified.      Impression: No mammographic evidence of malignancy.      BI-RADS CATEGORY:  BI-RADS Category:  1 Negative.  Recommendation:  Annual Screening.  Recommended Date:  1 Year.  Laterality:  Bilateral.      Based on the Tyrer-Cuzick model for breast cancer risk assessment,  this patient is at an elevated risk of developing breast cancer and  may benefit from additional screening with breast MRI or ultrasound.  Please note that this estimate is based on responses provided on the  patient questionnaire. For more information regarding high risk  consultation, please call [phone].      For any future breast imaging appointments, please call [phone]  ([phone]).          MACRO:  None      Signed by: Sarina Morgan 1/2/2025 12:05 PM  Dictation workstation:   [id number]      Assessment/Plan   Problem List Items Addressed This Visit           ICD-10-CM    Essential hypertension, benign I10    Adequately controlled.  Continue current medications.  No medication adjustments.  Advised low-salt diet and weight reduction as you are doing         Relevant Medications    chlorthalidone (Hygroton) 25 mg tablet    icosapent ethyL (Vascepa) 1 gram capsule    Other Relevant Orders    FSH & LH    Morbid obesity (Multi) E66.01    Continue with weight reduction efforts.  Patient to consider GLP-1 a in the future.            Other Visit Diagnoses         Codes      Health care maintenance    -  Primary Z00.00    Relevant Medications    icosapent ethyL (Vascepa) 1 gram capsule    Other Relevant Orders    Lipid Panel    Vitamin B12    Vitamin D 25-Hydroxy,Total (for eval of Vitamin D levels)    Hemoglobin A1c    Comprehensive metabolic panel    FSH & LH    Testosterone, total and free    CBC       Need for prophylactic vaccination against Streptococcus pneumoniae (pneumococcus)     Z23    Relevant Orders    Pneumococcal conjugate vaccine, 20-valent (PREVNAR 20) (Completed)              Dear Cinthya Simmons     It was my pleasure to take care of you today in the office. Below are the things we discussed today:    1. 1. Immunizations: Yearly Flu shot is recommended.   Pneumonia vaccine today.  Shingles vaccine tomorrow    2. Blood Work:  3. Seen your dentist twice a year  4. Yearly Eye exam is recommended    5. BMI:   6: Diet recommendations:   Eat Clean, Try to have as many home cooked meals as possible  Avoid processed foods which contain excess calories, sugar, and sodium.    7. Exercise recommendations:   150 minutes a week to maintain your weight     If you have to loose weight, you need a better diet and exercise plan.     8. Supplements recommended:  a - Calcium 600 mg up to twice a day to get a total of 1200 mg. Each 8 oz of milk or yogurt or 1 oz of cheese, 1 Banana, 1 serving of green Leafy vegetable has about 300 mg of Calcium, so you may subtract that amount. Calcium citrate is the only acceptable supplement to take if you take an acid suppressing medication like Prilosec; otherwise Calcium carbonate is acceptable too (It can cause Constipation).   b - Vitamin D - 2000 IU daily     9. Please get your Living will / Advance directive completed if you do not have one already. Please make sure our office has a copy of the latest one.     10. Colonoscopy: Uptodate  11. Mammogram: Uptodate,   12. PAP: Advised following up with gynecology  13. DEXA: N/A  14: Skin Check: Please see Dermatology once a year for a Skin Check.     15.  Follow-up annually or as needed    Follow up in one year for a Physical. Please call the office before your Physical to see if you need blood work completed prior to your physical.     Please call me if any questions arise from now until your next visit. I will call you  after I am done seeing patients. A Doctor is always available by phone when the office is closed. Please feel free to call for help with any problem that you feel shouldn't wait until the office re-opens.

## 2025-05-23 ENCOUNTER — APPOINTMENT (OUTPATIENT)
Dept: PRIMARY CARE | Facility: CLINIC | Age: 55
End: 2025-05-23
Payer: COMMERCIAL

## 2025-06-23 ENCOUNTER — APPOINTMENT (OUTPATIENT)
Dept: PODIATRY | Facility: CLINIC | Age: 55
End: 2025-06-23
Payer: COMMERCIAL

## 2025-07-09 ENCOUNTER — APPOINTMENT (OUTPATIENT)
Dept: UROLOGY | Facility: HOSPITAL | Age: 55
End: 2025-07-09
Payer: COMMERCIAL

## 2025-08-23 DIAGNOSIS — N95.1 VASOMOTOR SYMPTOMS DUE TO MENOPAUSE: ICD-10-CM

## 2025-08-23 DIAGNOSIS — R23.2 VASOMOTOR FLUSHING: ICD-10-CM

## 2025-08-23 RX ORDER — ESTRADIOL 0.05 MG/D
1 FILM, EXTENDED RELEASE TRANSDERMAL 2 TIMES WEEKLY
Qty: 24 PATCH | Refills: 3 | Status: SHIPPED | OUTPATIENT
Start: 2025-08-25 | End: 2026-08-25

## 2025-09-04 ENCOUNTER — APPOINTMENT (OUTPATIENT)
Dept: UROLOGY | Facility: CLINIC | Age: 55
End: 2025-09-04
Payer: COMMERCIAL

## 2025-09-04 ASSESSMENT — PAIN SCALES - GENERAL: PAINLEVEL_OUTOF10: 0-NO PAIN

## 2025-09-12 ENCOUNTER — APPOINTMENT (OUTPATIENT)
Dept: RADIOLOGY | Facility: HOSPITAL | Age: 55
End: 2025-09-12
Payer: COMMERCIAL

## 2025-09-20 ENCOUNTER — APPOINTMENT (OUTPATIENT)
Dept: RADIOLOGY | Facility: HOSPITAL | Age: 55
End: 2025-09-20
Payer: COMMERCIAL